# Patient Record
Sex: FEMALE | Race: BLACK OR AFRICAN AMERICAN | Employment: FULL TIME | URBAN - METROPOLITAN AREA
[De-identification: names, ages, dates, MRNs, and addresses within clinical notes are randomized per-mention and may not be internally consistent; named-entity substitution may affect disease eponyms.]

---

## 2017-08-16 ENCOUNTER — HOSPITAL ENCOUNTER (OUTPATIENT)
Age: 42
Setting detail: SPECIMEN
Discharge: HOME | End: 2017-08-16
Payer: COMMERCIAL

## 2017-08-16 DIAGNOSIS — D25.9 UTERINE LEIOMYOMA, UNSPECIFIED LOCATION: ICD-10-CM

## 2017-08-16 DIAGNOSIS — I10 ESSENTIAL HYPERTENSION: ICD-10-CM

## 2017-08-16 DIAGNOSIS — E55.9 VITAMIN D DEFICIENCY: ICD-10-CM

## 2017-08-16 LAB
ABSOLUTE EOS #: 0.1 K/UL (ref 0–0.4)
ABSOLUTE LYMPH #: 1.9 K/UL (ref 1–4.8)
ABSOLUTE MONO #: 0.5 K/UL (ref 0.1–1.2)
ALBUMIN SERPL-MCNC: 3.8 G/DL (ref 3.5–5.2)
ALBUMIN/GLOBULIN RATIO: 1.1 (ref 1–2.5)
ALP BLD-CCNC: 72 U/L (ref 35–104)
ALT SERPL-CCNC: 15 U/L (ref 5–33)
ANION GAP SERPL CALCULATED.3IONS-SCNC: 15 MMOL/L (ref 9–17)
AST SERPL-CCNC: 17 U/L
BASOPHILS # BLD: 1 %
BASOPHILS ABSOLUTE: 0.1 K/UL (ref 0–0.2)
BILIRUB SERPL-MCNC: 0.31 MG/DL (ref 0.3–1.2)
BUN BLDV-MCNC: 17 MG/DL (ref 6–20)
BUN/CREAT BLD: NORMAL (ref 9–20)
CALCIUM SERPL-MCNC: 9.3 MG/DL (ref 8.6–10.4)
CHLORIDE BLD-SCNC: 99 MMOL/L (ref 98–107)
CO2: 23 MMOL/L (ref 20–31)
CREAT SERPL-MCNC: 0.79 MG/DL (ref 0.5–0.9)
DIFFERENTIAL TYPE: NORMAL
EOSINOPHILS RELATIVE PERCENT: 2 %
GFR AFRICAN AMERICAN: >60 ML/MIN
GFR NON-AFRICAN AMERICAN: >60 ML/MIN
GFR SERPL CREATININE-BSD FRML MDRD: NORMAL ML/MIN/{1.73_M2}
GFR SERPL CREATININE-BSD FRML MDRD: NORMAL ML/MIN/{1.73_M2}
GLUCOSE BLD-MCNC: 89 MG/DL (ref 70–99)
HCT VFR BLD CALC: 36.4 % (ref 36–46)
HEMOGLOBIN: 12.3 G/DL (ref 12–16)
LYMPHOCYTES # BLD: 31 %
MCH RBC QN AUTO: 29.5 PG (ref 26–34)
MCHC RBC AUTO-ENTMCNC: 33.7 G/DL (ref 31–37)
MCV RBC AUTO: 87.7 FL (ref 80–100)
MONOCYTES # BLD: 8 %
PDW BLD-RTO: 14.1 % (ref 12.5–15.4)
PLATELET # BLD: 281 K/UL (ref 140–450)
PLATELET ESTIMATE: NORMAL
PMV BLD AUTO: 8.6 FL (ref 6–12)
POTASSIUM SERPL-SCNC: 3.7 MMOL/L (ref 3.7–5.3)
RBC # BLD: 4.15 M/UL (ref 4–5.2)
RBC # BLD: NORMAL 10*6/UL
SEG NEUTROPHILS: 58 %
SEGMENTED NEUTROPHILS ABSOLUTE COUNT: 3.5 K/UL (ref 1.8–7.7)
SODIUM BLD-SCNC: 137 MMOL/L (ref 135–144)
TOTAL PROTEIN: 7.4 G/DL (ref 6.4–8.3)
VITAMIN D 25-HYDROXY: 27.3 NG/ML (ref 30–100)
WBC # BLD: 6.1 K/UL (ref 3.5–11)
WBC # BLD: NORMAL 10*3/UL

## 2018-05-02 PROBLEM — G43.109 MIGRAINE WITH AURA AND WITHOUT STATUS MIGRAINOSUS, NOT INTRACTABLE: Status: ACTIVE | Noted: 2018-05-02

## 2018-06-08 ENCOUNTER — HOSPITAL ENCOUNTER (OUTPATIENT)
Age: 43
Setting detail: SPECIMEN
Discharge: HOME | End: 2018-06-08
Payer: COMMERCIAL

## 2018-06-08 DIAGNOSIS — R35.0 URINARY FREQUENCY: ICD-10-CM

## 2018-06-09 LAB
CULTURE: NORMAL
CULTURE: NORMAL
Lab: NORMAL
SPECIMEN DESCRIPTION: NORMAL
STATUS: NORMAL

## 2018-09-24 ENCOUNTER — HOSPITAL ENCOUNTER (OUTPATIENT)
Age: 43
Setting detail: SPECIMEN
Discharge: HOME | End: 2018-09-24
Payer: COMMERCIAL

## 2018-09-24 DIAGNOSIS — Z13.1 SCREENING FOR DIABETES MELLITUS: ICD-10-CM

## 2018-09-24 DIAGNOSIS — I10 ESSENTIAL HYPERTENSION: ICD-10-CM

## 2018-09-24 LAB
ALBUMIN SERPL-MCNC: 4 G/DL (ref 3.5–5.2)
ALBUMIN/GLOBULIN RATIO: 1 (ref 1–2.5)
ALP BLD-CCNC: 90 U/L (ref 35–104)
ALT SERPL-CCNC: 19 U/L (ref 5–33)
ANION GAP SERPL CALCULATED.3IONS-SCNC: 12 MMOL/L (ref 9–17)
AST SERPL-CCNC: 17 U/L
BILIRUB SERPL-MCNC: 0.26 MG/DL (ref 0.3–1.2)
BUN BLDV-MCNC: 15 MG/DL (ref 6–20)
BUN/CREAT BLD: ABNORMAL (ref 9–20)
CALCIUM SERPL-MCNC: 9.6 MG/DL (ref 8.6–10.4)
CHLORIDE BLD-SCNC: 101 MMOL/L (ref 98–107)
CHOLESTEROL/HDL RATIO: 4.2
CHOLESTEROL: 195 MG/DL
CO2: 29 MMOL/L (ref 20–31)
CREAT SERPL-MCNC: 0.95 MG/DL (ref 0.5–0.9)
GFR AFRICAN AMERICAN: >60 ML/MIN
GFR NON-AFRICAN AMERICAN: >60 ML/MIN
GFR SERPL CREATININE-BSD FRML MDRD: ABNORMAL ML/MIN/{1.73_M2}
GFR SERPL CREATININE-BSD FRML MDRD: ABNORMAL ML/MIN/{1.73_M2}
GLUCOSE FASTING: 99 MG/DL (ref 70–99)
HDLC SERPL-MCNC: 46 MG/DL
LDL CHOLESTEROL: 133 MG/DL (ref 0–130)
POTASSIUM SERPL-SCNC: 3.6 MMOL/L (ref 3.7–5.3)
SODIUM BLD-SCNC: 142 MMOL/L (ref 135–144)
TOTAL PROTEIN: 8.1 G/DL (ref 6.4–8.3)
TRIGL SERPL-MCNC: 82 MG/DL
VLDLC SERPL CALC-MCNC: ABNORMAL MG/DL (ref 1–30)

## 2018-12-27 ENCOUNTER — HOSPITAL ENCOUNTER (OUTPATIENT)
Age: 43
Setting detail: SPECIMEN
Discharge: HOME | End: 2018-12-27
Payer: COMMERCIAL

## 2018-12-27 DIAGNOSIS — I10 ESSENTIAL HYPERTENSION: ICD-10-CM

## 2018-12-27 LAB
ANION GAP SERPL CALCULATED.3IONS-SCNC: 12 MMOL/L (ref 9–17)
BUN BLDV-MCNC: 11 MG/DL (ref 6–20)
BUN/CREAT BLD: ABNORMAL (ref 9–20)
CALCIUM SERPL-MCNC: 9.3 MG/DL (ref 8.6–10.4)
CHLORIDE BLD-SCNC: 103 MMOL/L (ref 98–107)
CO2: 26 MMOL/L (ref 20–31)
CREAT SERPL-MCNC: 0.97 MG/DL (ref 0.5–0.9)
GFR AFRICAN AMERICAN: >60 ML/MIN
GFR NON-AFRICAN AMERICAN: >60 ML/MIN
GFR SERPL CREATININE-BSD FRML MDRD: ABNORMAL ML/MIN/{1.73_M2}
GFR SERPL CREATININE-BSD FRML MDRD: ABNORMAL ML/MIN/{1.73_M2}
GLUCOSE BLD-MCNC: 96 MG/DL (ref 70–99)
POTASSIUM SERPL-SCNC: 3.9 MMOL/L (ref 3.7–5.3)
SODIUM BLD-SCNC: 141 MMOL/L (ref 135–144)

## 2019-05-30 ENCOUNTER — HOSPITAL ENCOUNTER (OUTPATIENT)
Age: 44
Setting detail: SPECIMEN
Discharge: HOME | End: 2019-05-30
Payer: COMMERCIAL

## 2019-05-30 DIAGNOSIS — I10 ESSENTIAL HYPERTENSION: ICD-10-CM

## 2019-05-30 LAB
ANION GAP SERPL CALCULATED.3IONS-SCNC: 15 MMOL/L (ref 9–17)
BUN BLDV-MCNC: 19 MG/DL (ref 6–20)
BUN/CREAT BLD: ABNORMAL (ref 9–20)
CALCIUM SERPL-MCNC: 9.2 MG/DL (ref 8.6–10.4)
CHLORIDE BLD-SCNC: 103 MMOL/L (ref 98–107)
CO2: 26 MMOL/L (ref 20–31)
CREAT SERPL-MCNC: 0.9 MG/DL (ref 0.5–0.9)
GFR AFRICAN AMERICAN: >60 ML/MIN
GFR NON-AFRICAN AMERICAN: >60 ML/MIN
GFR SERPL CREATININE-BSD FRML MDRD: ABNORMAL ML/MIN/{1.73_M2}
GFR SERPL CREATININE-BSD FRML MDRD: ABNORMAL ML/MIN/{1.73_M2}
GLUCOSE BLD-MCNC: 92 MG/DL (ref 70–99)
POTASSIUM SERPL-SCNC: 3.3 MMOL/L (ref 3.7–5.3)
SODIUM BLD-SCNC: 144 MMOL/L (ref 135–144)

## 2019-06-14 ENCOUNTER — HOSPITAL ENCOUNTER (OUTPATIENT)
Age: 44
Setting detail: SPECIMEN
Discharge: HOME | End: 2019-06-14
Payer: COMMERCIAL

## 2019-06-14 DIAGNOSIS — E87.6 HYPOKALEMIA: ICD-10-CM

## 2019-06-14 LAB — POTASSIUM SERPL-SCNC: 3.6 MMOL/L (ref 3.7–5.3)

## 2019-10-17 ENCOUNTER — HOSPITAL ENCOUNTER (OUTPATIENT)
Age: 44
Setting detail: SPECIMEN
Discharge: HOME | End: 2019-10-17
Payer: COMMERCIAL

## 2019-10-17 ENCOUNTER — HOSPITAL ENCOUNTER (OUTPATIENT)
Dept: MAMMOGRAPHY | Facility: CLINIC | Age: 44
Discharge: HOME | End: 2019-10-19
Payer: COMMERCIAL

## 2019-10-17 DIAGNOSIS — Z13.220 SCREENING FOR LIPID DISORDERS: ICD-10-CM

## 2019-10-17 DIAGNOSIS — Z13.1 SCREENING FOR DIABETES MELLITUS: ICD-10-CM

## 2019-10-17 DIAGNOSIS — I10 ESSENTIAL HYPERTENSION: ICD-10-CM

## 2019-10-17 DIAGNOSIS — Z12.31 SCREENING MAMMOGRAM, ENCOUNTER FOR: ICD-10-CM

## 2019-10-17 LAB
ALBUMIN SERPL-MCNC: 3.8 G/DL (ref 3.5–5.2)
ALBUMIN/GLOBULIN RATIO: 1 (ref 1–2.5)
ALP BLD-CCNC: 86 U/L (ref 35–104)
ALT SERPL-CCNC: 13 U/L (ref 5–33)
ANION GAP SERPL CALCULATED.3IONS-SCNC: 13 MMOL/L (ref 9–17)
AST SERPL-CCNC: 15 U/L
BILIRUB SERPL-MCNC: 0.33 MG/DL (ref 0.3–1.2)
BUN BLDV-MCNC: 14 MG/DL (ref 6–20)
BUN/CREAT BLD: ABNORMAL (ref 9–20)
CALCIUM SERPL-MCNC: 9.2 MG/DL (ref 8.6–10.4)
CHLORIDE BLD-SCNC: 104 MMOL/L (ref 98–107)
CHOLESTEROL/HDL RATIO: 3.5
CHOLESTEROL: 180 MG/DL
CO2: 26 MMOL/L (ref 20–31)
CREAT SERPL-MCNC: 0.92 MG/DL (ref 0.5–0.9)
GFR AFRICAN AMERICAN: >60 ML/MIN
GFR NON-AFRICAN AMERICAN: >60 ML/MIN
GFR SERPL CREATININE-BSD FRML MDRD: ABNORMAL ML/MIN/{1.73_M2}
GFR SERPL CREATININE-BSD FRML MDRD: ABNORMAL ML/MIN/{1.73_M2}
GLUCOSE FASTING: 98 MG/DL (ref 70–99)
HDLC SERPL-MCNC: 52 MG/DL
LDL CHOLESTEROL: 108 MG/DL (ref 0–130)
POTASSIUM SERPL-SCNC: 3.5 MMOL/L (ref 3.7–5.3)
SODIUM BLD-SCNC: 143 MMOL/L (ref 135–144)
TOTAL PROTEIN: 7.8 G/DL (ref 6.4–8.3)
TRIGL SERPL-MCNC: 102 MG/DL
VLDLC SERPL CALC-MCNC: NORMAL MG/DL (ref 1–30)

## 2019-10-17 PROCEDURE — 77067 SCR MAMMO BI INCL CAD: CPT

## 2020-08-17 ENCOUNTER — HOSPITAL ENCOUNTER (OUTPATIENT)
Age: 45
Setting detail: SPECIMEN
Discharge: HOME | End: 2020-08-17
Payer: COMMERCIAL

## 2020-08-17 LAB
ALBUMIN SERPL-MCNC: 3.6 G/DL (ref 3.5–5.2)
ALBUMIN/GLOBULIN RATIO: 1.1 (ref 1–2.5)
ALP BLD-CCNC: 70 U/L (ref 35–104)
ALT SERPL-CCNC: 13 U/L (ref 5–33)
ANION GAP SERPL CALCULATED.3IONS-SCNC: 11 MMOL/L (ref 9–17)
AST SERPL-CCNC: 16 U/L
BILIRUB SERPL-MCNC: 0.27 MG/DL (ref 0.3–1.2)
BUN BLDV-MCNC: 12 MG/DL (ref 6–20)
BUN/CREAT BLD: ABNORMAL (ref 9–20)
CALCIUM SERPL-MCNC: 9 MG/DL (ref 8.6–10.4)
CHLORIDE BLD-SCNC: 103 MMOL/L (ref 98–107)
CHOLESTEROL/HDL RATIO: 3.5
CHOLESTEROL: 175 MG/DL
CO2: 26 MMOL/L (ref 20–31)
CREAT SERPL-MCNC: 0.93 MG/DL (ref 0.5–0.9)
GFR AFRICAN AMERICAN: >60 ML/MIN
GFR NON-AFRICAN AMERICAN: >60 ML/MIN
GFR SERPL CREATININE-BSD FRML MDRD: ABNORMAL ML/MIN/{1.73_M2}
GFR SERPL CREATININE-BSD FRML MDRD: ABNORMAL ML/MIN/{1.73_M2}
GLUCOSE FASTING: 91 MG/DL (ref 70–99)
HDLC SERPL-MCNC: 50 MG/DL
LDL CHOLESTEROL: 111 MG/DL (ref 0–130)
POTASSIUM SERPL-SCNC: 3.6 MMOL/L (ref 3.7–5.3)
SODIUM BLD-SCNC: 140 MMOL/L (ref 135–144)
TOTAL PROTEIN: 7 G/DL (ref 6.4–8.3)
TRIGL SERPL-MCNC: 72 MG/DL
VLDLC SERPL CALC-MCNC: NORMAL MG/DL (ref 1–30)

## 2020-11-05 ENCOUNTER — APPOINTMENT (OUTPATIENT)
Dept: GENERAL RADIOLOGY | Facility: CLINIC | Age: 45
End: 2020-11-05
Payer: COMMERCIAL

## 2020-11-05 ENCOUNTER — APPOINTMENT (OUTPATIENT)
Dept: CT IMAGING | Facility: CLINIC | Age: 45
End: 2020-11-05
Payer: COMMERCIAL

## 2020-11-05 ENCOUNTER — HOSPITAL ENCOUNTER (EMERGENCY)
Facility: CLINIC | Age: 45
Discharge: HOME | End: 2020-11-05
Attending: EMERGENCY MEDICINE
Payer: COMMERCIAL

## 2020-11-05 VITALS
HEART RATE: 89 BPM | BODY MASS INDEX: 36.32 KG/M2 | OXYGEN SATURATION: 99 % | HEIGHT: 65 IN | TEMPERATURE: 98.3 F | RESPIRATION RATE: 20 BRPM | WEIGHT: 218 LBS | SYSTOLIC BLOOD PRESSURE: 155 MMHG | DIASTOLIC BLOOD PRESSURE: 75 MMHG

## 2020-11-05 LAB
ABSOLUTE EOS #: 0.1 K/UL (ref 0–0.4)
ABSOLUTE IMMATURE GRANULOCYTE: NORMAL K/UL (ref 0–0.3)
ABSOLUTE LYMPH #: 2.3 K/UL (ref 1–4.8)
ABSOLUTE MONO #: 0.4 K/UL (ref 0.1–1.2)
ALBUMIN SERPL-MCNC: 4.2 G/DL (ref 3.5–5.2)
ALBUMIN/GLOBULIN RATIO: 1.1 (ref 1–2.5)
ALP BLD-CCNC: 83 U/L (ref 35–104)
ALT SERPL-CCNC: 13 U/L (ref 5–33)
ANION GAP SERPL CALCULATED.3IONS-SCNC: 10 MMOL/L (ref 9–17)
AST SERPL-CCNC: 19 U/L
BASOPHILS # BLD: 0 % (ref 0–2)
BASOPHILS ABSOLUTE: 0 K/UL (ref 0–0.2)
BILIRUB SERPL-MCNC: 0.3 MG/DL (ref 0.3–1.2)
BILIRUBIN DIRECT: <0.08 MG/DL
BILIRUBIN, INDIRECT: NORMAL MG/DL (ref 0–1)
BNP INTERPRETATION: NORMAL
BUN BLDV-MCNC: 16 MG/DL (ref 6–20)
BUN/CREAT BLD: ABNORMAL (ref 9–20)
CALCIUM SERPL-MCNC: 9.4 MG/DL (ref 8.6–10.4)
CHLORIDE BLD-SCNC: 105 MMOL/L (ref 98–107)
CK MB: 2.4 NG/ML
CO2: 25 MMOL/L (ref 20–31)
CREAT SERPL-MCNC: 1 MG/DL (ref 0.5–0.9)
D-DIMER QUANTITATIVE: 0.61 MG/L FEU
DIFFERENTIAL TYPE: NORMAL
EOSINOPHILS RELATIVE PERCENT: 1 % (ref 1–4)
GFR AFRICAN AMERICAN: >60 ML/MIN
GFR NON-AFRICAN AMERICAN: 59 ML/MIN
GFR SERPL CREATININE-BSD FRML MDRD: ABNORMAL ML/MIN/{1.73_M2}
GFR SERPL CREATININE-BSD FRML MDRD: ABNORMAL ML/MIN/{1.73_M2}
GLOBULIN: NORMAL G/DL (ref 1.5–3.8)
GLUCOSE BLD-MCNC: 95 MG/DL (ref 70–99)
HCT VFR BLD CALC: 38.6 % (ref 36–46)
HEMOGLOBIN: 12.7 G/DL (ref 12–16)
IMMATURE GRANULOCYTES: NORMAL %
LYMPHOCYTES # BLD: 34 % (ref 24–44)
MAGNESIUM: 2.2 MG/DL (ref 1.6–2.6)
MCH RBC QN AUTO: 29 PG (ref 26–34)
MCHC RBC AUTO-ENTMCNC: 33 G/DL (ref 31–37)
MCV RBC AUTO: 87.8 FL (ref 80–100)
MONOCYTES # BLD: 6 % (ref 2–11)
MYOGLOBIN: 44 NG/ML (ref 25–58)
NRBC AUTOMATED: NORMAL PER 100 WBC
PDW BLD-RTO: 14.1 % (ref 12.5–15.4)
PLATELET # BLD: 282 K/UL (ref 140–450)
PLATELET ESTIMATE: NORMAL
PMV BLD AUTO: 7.4 FL (ref 6–12)
POTASSIUM SERPL-SCNC: 3.6 MMOL/L (ref 3.7–5.3)
PRO-BNP: 93 PG/ML
RBC # BLD: 4.39 M/UL (ref 4–5.2)
RBC # BLD: NORMAL 10*6/UL
SEG NEUTROPHILS: 59 % (ref 36–66)
SEGMENTED NEUTROPHILS ABSOLUTE COUNT: 4 K/UL (ref 1.8–7.7)
SODIUM BLD-SCNC: 140 MMOL/L (ref 135–144)
TOTAL CK: 303 U/L (ref 26–192)
TOTAL PROTEIN: 8.1 G/DL (ref 6.4–8.3)
TROPONIN INTERP: NORMAL
TROPONIN T: NORMAL NG/ML
TROPONIN, HIGH SENSITIVITY: <6 NG/L (ref 0–14)
WBC # BLD: 6.8 K/UL (ref 3.5–11)
WBC # BLD: NORMAL 10*3/UL

## 2020-11-05 PROCEDURE — 85025 COMPLETE CBC W/AUTO DIFF WBC: CPT

## 2020-11-05 PROCEDURE — 6360000004 HC RX CONTRAST MEDICATION: Performed by: EMERGENCY MEDICINE

## 2020-11-05 PROCEDURE — 36415 COLL VENOUS BLD VENIPUNCTURE: CPT

## 2020-11-05 PROCEDURE — 80076 HEPATIC FUNCTION PANEL: CPT

## 2020-11-05 PROCEDURE — 6370000000 HC RX 637 (ALT 250 FOR IP): Performed by: EMERGENCY MEDICINE

## 2020-11-05 PROCEDURE — 93005 ELECTROCARDIOGRAM TRACING: CPT | Performed by: EMERGENCY MEDICINE

## 2020-11-05 PROCEDURE — 83874 ASSAY OF MYOGLOBIN: CPT

## 2020-11-05 PROCEDURE — 82550 ASSAY OF CK (CPK): CPT

## 2020-11-05 PROCEDURE — 82553 CREATINE MB FRACTION: CPT

## 2020-11-05 PROCEDURE — 2580000003 HC RX 258: Performed by: EMERGENCY MEDICINE

## 2020-11-05 PROCEDURE — 99285 EMERGENCY DEPT VISIT HI MDM: CPT

## 2020-11-05 PROCEDURE — 71260 CT THORAX DX C+: CPT

## 2020-11-05 PROCEDURE — 80048 BASIC METABOLIC PNL TOTAL CA: CPT

## 2020-11-05 PROCEDURE — 71045 X-RAY EXAM CHEST 1 VIEW: CPT

## 2020-11-05 PROCEDURE — 96360 HYDRATION IV INFUSION INIT: CPT

## 2020-11-05 PROCEDURE — 85379 FIBRIN DEGRADATION QUANT: CPT

## 2020-11-05 PROCEDURE — 83880 ASSAY OF NATRIURETIC PEPTIDE: CPT

## 2020-11-05 PROCEDURE — 83735 ASSAY OF MAGNESIUM: CPT

## 2020-11-05 PROCEDURE — 84484 ASSAY OF TROPONIN QUANT: CPT

## 2020-11-05 RX ORDER — POTASSIUM CHLORIDE 20 MEQ/1
20 TABLET, EXTENDED RELEASE ORAL DAILY
Qty: 20 TABLET | Refills: 0 | Status: ON HOLD | OUTPATIENT
Start: 2020-11-05 | End: 2022-11-01 | Stop reason: HOSPADM

## 2020-11-05 RX ORDER — 0.9 % SODIUM CHLORIDE 0.9 %
1000 INTRAVENOUS SOLUTION INTRAVENOUS ONCE
Status: COMPLETED | OUTPATIENT
Start: 2020-11-05 | End: 2020-11-05

## 2020-11-05 RX ORDER — SODIUM CHLORIDE 0.9 % (FLUSH) 0.9 %
10 SYRINGE (ML) INJECTION PRN
Status: DISCONTINUED | OUTPATIENT
Start: 2020-11-05 | End: 2020-11-05 | Stop reason: HOSPADM

## 2020-11-05 RX ORDER — 0.9 % SODIUM CHLORIDE 0.9 %
70 INTRAVENOUS SOLUTION INTRAVENOUS ONCE
Status: COMPLETED | OUTPATIENT
Start: 2020-11-05 | End: 2020-11-05

## 2020-11-05 RX ADMIN — IOPAMIDOL 75 ML: 755 INJECTION, SOLUTION INTRAVENOUS at 12:10

## 2020-11-05 RX ADMIN — SODIUM CHLORIDE 70 ML: 9 INJECTION, SOLUTION INTRAVENOUS at 12:10

## 2020-11-05 RX ADMIN — POTASSIUM BICARBONATE 40 MEQ: 782 TABLET, EFFERVESCENT ORAL at 11:56

## 2020-11-05 RX ADMIN — SODIUM CHLORIDE 1000 ML: 9 INJECTION, SOLUTION INTRAVENOUS at 11:56

## 2020-11-05 RX ADMIN — SODIUM CHLORIDE, PRESERVATIVE FREE 10 ML: 5 INJECTION INTRAVENOUS at 12:10

## 2020-11-05 ASSESSMENT — PAIN SCALES - GENERAL: PAINLEVEL_OUTOF10: 8

## 2020-11-05 ASSESSMENT — PAIN DESCRIPTION - PAIN TYPE: TYPE: ACUTE PAIN

## 2020-11-05 ASSESSMENT — PAIN DESCRIPTION - LOCATION: LOCATION: CHEST

## 2020-11-05 NOTE — ED PROVIDER NOTES
[address] ED  EMERGENCY DEPARTMENT ENCOUNTER      Pt Name: Alexis Vasquez  MRN: [id number]  [place] 1975  Date of evaluation: 11/5/2020  Provider: Krista Villegas MD    CHIEF COMPLAINT     Chief Complaint   Patient presents with    Chest Pain     chest pressure, shortness of breath, pain with inspiration since tuesday         HISTORY OF PRESENT ILLNESS   (Location/Symptom, Timing/Onset, Context/Setting,Quality, Duration, Modifying Factors, Severity)  Note limiting factors. Alexis Vasquez is a 50 y.o. female who presents to the emergency department with a 2-day history of pressure-like pain over the anterior chest and states that if she speaks for too long she feels short of breath but not otherwise. Pain does not radiate. About the same time she also reports pleuritic pain underneath and onto the side of the left breast that she experiences with deep breathing. She denies wheezing or hemoptysis, chills or fever. Patient has a history of hypertension but no history of heart problems and is not diabetic. She is not a cigarette smoker and is not on any hormone replacement therapy. The history is provided by the patient. Nursing Notes werereviewed. REVIEW OF SYSTEMS    (2-9 systems for level 4, 10 or more for level 5)     Review of Systems   Constitutional: Negative for fatigue and fever. Musculoskeletal: Negative for myalgias. All other systems reviewed and are negative. Except as noted above the remainder of the review of systems was reviewed and negative.        PAST MEDICAL HISTORY     Past Medical History:   Diagnosis Date    Asthma     Headache     Hyperlipidemia     Hypertension     Insomnia          SURGICALHISTORY       Past Surgical History:   Procedure Laterality Date    TUBAL LIGATION  1998         CURRENT MEDICATIONS       Discharge Medication List as of 11/5/2020 12:53 PM      CONTINUE these medications which have NOT CHANGED    Details   montelukast (SINGULAIR) 10 MG tablet TAKE 1 TABLET BY MOUTH ONE TIME A DAY , Disp-30 tablet,R-0Normal      SUMAtriptan (IMITREX) 50 MG tablet Take 1 tablet by mouth as needed for Migraine Take 1 tablet by mouth daily as needed for Migraine, Disp-9 tablet,R-1Normal      budesonide-formoterol (SYMBICORT) 160-4.5 MCG/ACT AERO Inhale 2 puffs into the lungs 2 times daily, Disp-1 Inhaler,R-2Normal      atorvastatin (LIPITOR) 10 MG tablet Take One Tablet By Mouth Daily, Disp-30 tablet,R-2Normal      hydroCHLOROthiazide (HYDRODIURIL) 25 MG tablet Take One Tablet By Mouth Daily, Disp-30 tablet,R-2Normal      meloxicam (MOBIC) 15 MG tablet Take 1 tablet by mouth daily, Disp-30 tablet,R-2Normal      metoprolol succinate (TOPROL XL) 25 MG extended release tablet Take One Table By Mouth Daily, Disp-30 tablet,R-2Normal      albuterol (PROVENTIL) (2.5 MG/3ML) 0.083% nebulizer solution Take 3 mLs by nebulization every 6 hours as needed for Wheezing, Disp-60 each, R-1Normal      albuterol sulfate HFA (PROAIR HFA) 108 (90 Base) MCG/ACT inhaler Inhale 2 puffs into the lungs every 6 hours as needed for Wheezing, Disp-1 Inhaler, R-1Normal      albuterol (PROVENTIL) (5 MG/ML) 0.5% nebulizer solution Take 1 mL by nebulization 4 times daily as needed for Wheezing, Disp-120 each, R-1Normal      benzonatate (TESSALON) 100 MG capsule TAKE 1 CAPSULE BY MOUTH THREE TIMES A DAY AS NEEDED for cough, R-0Historical Med      PREMPRO 0.3-1.5 MG per tablet DAWHistorical Med      Spacer/Aero-Holding Chambers YENNIFER DAILY PRN Starting Mon 2/25/2019, Disp-1 Device, R-0, Normal      Cholecalciferol (VITAMIN D3) 2000 UNITS CAPS Take 1 capsule by mouth daily             ALLERGIES     Tramadol    FAMILY HISTORY       Family History   Problem Relation Age of Onset    Other Father         MI    Hypertension Brother           SOCIAL HISTORY       Social History     Socioeconomic History    Marital status: Single     Spouse name: Not on file    Number of children: Not on file    Years of education: Not on file    Highest education level: Not on file   Occupational History    Not on file   Social Needs    Financial resource strain: Not hard at all    Food insecurity     Worry: Never true     Inability: Never true   [employer] needs     Medical: No     Non-medical: No   Tobacco Use    Smoking status: Never Smoker    Smokeless tobacco: Never Used   Substance and Sexual Activity    Alcohol use: Yes     Alcohol/week: 1.0 standard drinks     Types: 1 Glasses of wine per week     Comment: rare wine     Drug use: No    Sexual activity: Not on file   Lifestyle    Physical activity     Days per week: Not on file     Minutes per session: Not on file    Stress: Not on file   Relationships    Social connections     Talks on phone: Not on file     Gets together: Not on file     Attends Protestant service: Not on file     Active member of club or organization: Not on file     Attends meetings of clubs or organizations: Not on file     Relationship status: Not on file    Intimate partner violence     Fear of current or ex partner: Not on file     Emotionally abused: Not on file     Physically abused: Not on file     Forced sexual activity: Not on file   Other Topics Concern    Not on file   Social History Narrative    Not on file       SCREENINGS    [place] Coma Scale  Eye Opening: Spontaneous  Best Verbal Response: Oriented  Best Motor Response: Obeys commands  [place] Coma Scale Score: 15        PHYSICAL EXAM    (up to 7 for level 4, 8 or more for level 5)     ED Triage Vitals [11/05/20 1048]   BP Temp Temp Source Pulse Resp SpO2 Height Weight   (!) 177/107 98.3 °F (36.8 °C) Oral 100 20 99 % 5' 5\" (1.651 m) 218 lb (98.9 kg)       Physical Exam  Vitals signs reviewed. Constitutional:       General: She is not in acute distress. Appearance: She is obese. HENT:      Head: Normocephalic.       Right Ear: External ear normal.      Left Ear: External ear normal.      Nose: Nose normal.   Eyes:      Extraocular Movements: Extraocular movements intact. Neck:      Musculoskeletal: Neck supple. Cardiovascular:      Rate and Rhythm: Normal rate and regular rhythm. Heart sounds: Normal heart sounds. Pulmonary:      Effort: Pulmonary effort is normal. No respiratory distress. Breath sounds: Normal breath sounds. Chest:      Chest wall: Tenderness present. Abdominal:      Palpations: Abdomen is soft. Tenderness: There is no abdominal tenderness. Musculoskeletal: Normal range of motion. General: No swelling, tenderness or signs of injury. Skin:     General: Skin is warm and dry. Neurological:      General: No focal deficit present. Mental Status: She is alert and oriented to person, place, and time. DIAGNOSTIC RESULTS     EKG: All EKG's are interpreted by the Emergency Department Physician who either signs orCo-signs this chart in the absence of a cardiologist.    Sinus rhythm with rate 86 beats a minute. No acute findings. RADIOLOGY:   Non-plain film images such as CT, Ultrasound and MRI are read by the radiologist. Plain radiographic images are visualized and preliminarily interpreted by the emergency physician with the below findings:    Interpretation per the Radiologist below, ifavailable at the time of this note:    CT CHEST PULMONARY EMBOLISM W CONTRAST   Final Result   No evidence of pulmonary embolism or acute pulmonary abnormality. XR CHEST PORTABLE   Final Result   No radiographic evidence of acute cardiopulmonary disease. ED BEDSIDE ULTRASOUND:   Performed by ED Physician - none    LABS:  Labs Reviewed   BASIC METABOLIC PANEL - Abnormal; Notable for the following components:       Result Value    CREATININE 1.00 (*)     Potassium 3.6 (*)     GFR Non- 59 (*)     All other components within normal limits   CK - Abnormal; Notable for the following components:     Total  (*)     All other components within normal limits   CBC WITH AUTO DIFFERENTIAL   BRAIN NATRIURETIC PEPTIDE   HEPATIC FUNCTION PANEL   MAGNESIUM   TROPONIN   D-DIMER, QUANTITATIVE   CK-MB   MYOGLOBIN, SERUM       All other labs were within normal range ornot returned as of this dictation. EMERGENCY DEPARTMENT COURSE and DIFFERENTIAL DIAGNOSIS/MDM:   Vitals:    Vitals:    11/05/20 1048 11/05/20 1154   BP: (!) 177/107 (!) 155/75   Pulse: 100 89   Resp: 20 20   Temp: 98.3 °F (36.8 °C) 98.3 °F (36.8 °C)   TempSrc: Oral Oral   SpO2: 99% 99%   Weight: 98.9 kg (218 lb)    Height: 5' 5\" (1.651 m)             Patient had a slightly low potassium level which was replenished with oral potassium in the ED. She is on diuretics at home. She also takes a statin. Her chest pain is felt to be musculoskeletal in etiology. Upon discharge she is placed on oral potassium supplementation and is advised to hold her statin for a few weeks to see if that helps her pain. She is advised to follow-up with her primary care physician for further management. MDM    CONSULTS:  None    PROCEDURES:  Unlessotherwise noted below, none     Procedures    FINAL IMPRESSION      1. Atypical chest pain          DISPOSITION/PLAN   DISPOSITION Decision To Discharge 11/05/2020 12:50:55 PM      PATIENT REFERRED TO:  Savita Deng DO  [address] [place] [id number]  [phone]            DISCHARGE MEDICATIONS:         Problem List:  Patient Active Problem List   Diagnosis Code    Asthma J45.909    Essential hypertension I10    Pure hypercholesterolemia E78.00    Uterine fibroid D25.9    Migraine with aura and without status migrainosus, not intractable G43.109           Summation      Patient Course: Discharged.     ED Medicationsadministered this visit:    Medications   potassium bicarb-citric acid (EFFER-K) effervescent tablet 40 mEq (40 mEq Oral Given 11/5/20 1156)   0.9 % sodium chloride bolus (0 mLs Intravenous Stopped 11/5/20 1256)   0.9 % sodium chloride bolus (0 mLs Intravenous Stopped 11/5/20 1211)   iopamidol (ISOVUE-370) 76 % injection 75 mL (75 mLs Intravenous Given 11/5/20 1210)       New Prescriptions from this visit:    Discharge Medication List as of 11/5/2020 12:53 PM      START taking these medications    Details   potassium chloride (KLOR-CON M) 20 MEQ extended release tablet Take 1 tablet by mouth daily for 20 days, Disp-20 tablet,R-0Print             Follow-up:  Berhane Dang DO  [address] [place] [address]  [phone]              Final Impression:   1.  Atypical chest pain               (Please note that portions of this note were completed with a voice recognitionprogram.  Efforts were made to edit the dictations but occasionally words are mis-transcribed.)    Torres Pelaez MD (electronically signed)  Attending Emergency Physician            Torres Pelaez MD  11/07/20 7019

## 2020-11-06 LAB
EKG ATRIAL RATE: 86 BPM
EKG P AXIS: 76 DEGREES
EKG P-R INTERVAL: 190 MS
EKG Q-T INTERVAL: 370 MS
EKG QRS DURATION: 78 MS
EKG QTC CALCULATION (BAZETT): 442 MS
EKG R AXIS: 45 DEGREES
EKG T AXIS: 18 DEGREES
EKG VENTRICULAR RATE: 86 BPM

## 2021-01-28 ENCOUNTER — HOSPITAL ENCOUNTER (OUTPATIENT)
Dept: MAMMOGRAPHY | Facility: CLINIC | Age: 46
Discharge: HOME | End: 2021-01-30
Payer: COMMERCIAL

## 2021-01-28 DIAGNOSIS — Z12.31 VISIT FOR SCREENING MAMMOGRAM: ICD-10-CM

## 2021-01-28 PROCEDURE — 77067 SCR MAMMO BI INCL CAD: CPT

## 2021-03-01 ENCOUNTER — HOSPITAL ENCOUNTER (OUTPATIENT)
Dept: GENERAL RADIOLOGY | Facility: CLINIC | Age: 46
Discharge: HOME | End: 2021-03-03
Payer: COMMERCIAL

## 2021-03-01 DIAGNOSIS — M54.32 LEFT SCIATIC NERVE PAIN: ICD-10-CM

## 2021-03-01 PROCEDURE — 72100 X-RAY EXAM L-S SPINE 2/3 VWS: CPT

## 2021-03-01 PROCEDURE — 72202 X-RAY EXAM SI JOINTS 3/> VWS: CPT

## 2021-06-16 ENCOUNTER — HOSPITAL ENCOUNTER (EMERGENCY)
Age: 46
Discharge: HOME | End: 2021-06-16
Attending: EMERGENCY MEDICINE
Payer: COMMERCIAL

## 2021-06-16 ENCOUNTER — APPOINTMENT (OUTPATIENT)
Dept: GENERAL RADIOLOGY | Age: 46
End: 2021-06-16
Payer: COMMERCIAL

## 2021-06-16 VITALS
HEIGHT: 55 IN | BODY MASS INDEX: 49.99 KG/M2 | OXYGEN SATURATION: 98 % | SYSTOLIC BLOOD PRESSURE: 149 MMHG | TEMPERATURE: 97.9 F | WEIGHT: 216 LBS | HEART RATE: 85 BPM | DIASTOLIC BLOOD PRESSURE: 92 MMHG | RESPIRATION RATE: 18 BRPM

## 2021-06-16 DIAGNOSIS — R07.9 CHEST PAIN, UNSPECIFIED TYPE: Primary | ICD-10-CM

## 2021-06-16 DIAGNOSIS — S39.012A BACK STRAIN, INITIAL ENCOUNTER: ICD-10-CM

## 2021-06-16 LAB
ABSOLUTE EOS #: 0.08 K/UL (ref 0–0.44)
ABSOLUTE IMMATURE GRANULOCYTE: 0.02 K/UL (ref 0–0.3)
ABSOLUTE LYMPH #: 1.66 K/UL (ref 1.1–3.7)
ABSOLUTE MONO #: 0.44 K/UL (ref 0.1–1.2)
ALBUMIN SERPL-MCNC: 3.7 G/DL (ref 3.5–5.2)
ALBUMIN/GLOBULIN RATIO: ABNORMAL (ref 1–2.5)
ALP BLD-CCNC: 88 U/L (ref 35–104)
ALT SERPL-CCNC: 9 U/L (ref 5–33)
ANION GAP SERPL CALCULATED.3IONS-SCNC: 11 MMOL/L (ref 9–17)
AST SERPL-CCNC: 15 U/L
BASOPHILS # BLD: 1 % (ref 0–2)
BASOPHILS ABSOLUTE: 0.03 K/UL (ref 0–0.2)
BILIRUB SERPL-MCNC: 0.26 MG/DL (ref 0.3–1.2)
BILIRUBIN, POC: NORMAL
BLOOD URINE, POC: NORMAL
BUN BLDV-MCNC: 18 MG/DL (ref 6–20)
BUN/CREAT BLD: 20 (ref 9–20)
CALCIUM SERPL-MCNC: 9.2 MG/DL (ref 8.6–10.4)
CHLORIDE BLD-SCNC: 104 MMOL/L (ref 98–107)
CHP ED QC CHECK: NORMAL
CHP ED QC CHECK: NORMAL
CO2: 23 MMOL/L (ref 20–31)
CREAT SERPL-MCNC: 0.89 MG/DL (ref 0.5–0.9)
D-DIMER QUANTITATIVE: <0.27 MG/L FEU (ref 0–0.59)
DIFFERENTIAL TYPE: ABNORMAL
EOSINOPHILS RELATIVE PERCENT: 1 % (ref 1–4)
GFR AFRICAN AMERICAN: >60 ML/MIN
GFR NON-AFRICAN AMERICAN: >60 ML/MIN
GFR SERPL CREATININE-BSD FRML MDRD: ABNORMAL ML/MIN/{1.73_M2}
GFR SERPL CREATININE-BSD FRML MDRD: ABNORMAL ML/MIN/{1.73_M2}
GLUCOSE BLD-MCNC: 90 MG/DL (ref 70–99)
GLUCOSE URINE, POC: NORMAL
HCT VFR BLD CALC: 37 % (ref 36.3–47.1)
HEMOGLOBIN: 11.7 G/DL (ref 11.9–15.1)
IMMATURE GRANULOCYTES: 0 %
KETONES, POC: NORMAL
LEUKOCYTE EST, POC: NORMAL
LYMPHOCYTES # BLD: 28 % (ref 24–43)
MCH RBC QN AUTO: 28.6 PG (ref 25.2–33.5)
MCHC RBC AUTO-ENTMCNC: 31.6 G/DL (ref 28.4–34.8)
MCV RBC AUTO: 90.5 FL (ref 82.6–102.9)
MONOCYTES # BLD: 7 % (ref 3–12)
NITRITE, POC: NORMAL
NRBC AUTOMATED: 0 PER 100 WBC
PDW BLD-RTO: 13.2 % (ref 11.8–14.4)
PH, POC: 5
PLATELET # BLD: 244 K/UL (ref 138–453)
PLATELET ESTIMATE: ABNORMAL
PMV BLD AUTO: 9.4 FL (ref 8.1–13.5)
POTASSIUM SERPL-SCNC: 3.6 MMOL/L (ref 3.7–5.3)
PREGNANCY TEST URINE, POC: NORMAL
PROTEIN, POC: NORMAL
RBC # BLD: 4.09 M/UL (ref 3.95–5.11)
RBC # BLD: ABNORMAL 10*6/UL
SEG NEUTROPHILS: 63 % (ref 36–65)
SEGMENTED NEUTROPHILS ABSOLUTE COUNT: 3.79 K/UL (ref 1.5–8.1)
SODIUM BLD-SCNC: 138 MMOL/L (ref 135–144)
SPECIFIC GRAVITY, POC: 1.01
TOTAL PROTEIN: 7.2 G/DL (ref 6.4–8.3)
TROPONIN INTERP: NORMAL
TROPONIN T: NORMAL NG/ML
TROPONIN, HIGH SENSITIVITY: <6 NG/L (ref 0–14)
UROBILINOGEN, POC: 0.2
WBC # BLD: 6 K/UL (ref 3.5–11.3)
WBC # BLD: ABNORMAL 10*3/UL

## 2021-06-16 PROCEDURE — 84484 ASSAY OF TROPONIN QUANT: CPT

## 2021-06-16 PROCEDURE — 6360000002 HC RX W HCPCS: Performed by: EMERGENCY MEDICINE

## 2021-06-16 PROCEDURE — 96374 THER/PROPH/DIAG INJ IV PUSH: CPT

## 2021-06-16 PROCEDURE — 85379 FIBRIN DEGRADATION QUANT: CPT

## 2021-06-16 PROCEDURE — 93005 ELECTROCARDIOGRAM TRACING: CPT | Performed by: EMERGENCY MEDICINE

## 2021-06-16 PROCEDURE — 85025 COMPLETE CBC W/AUTO DIFF WBC: CPT

## 2021-06-16 PROCEDURE — 80053 COMPREHEN METABOLIC PANEL: CPT

## 2021-06-16 PROCEDURE — 71046 X-RAY EXAM CHEST 2 VIEWS: CPT

## 2021-06-16 PROCEDURE — 99282 EMERGENCY DEPT VISIT SF MDM: CPT

## 2021-06-16 RX ORDER — IBUPROFEN 800 MG/1
800 TABLET ORAL EVERY 8 HOURS PRN
Qty: 30 TABLET | Refills: 0 | Status: ON HOLD | OUTPATIENT
Start: 2021-06-16 | End: 2022-11-01 | Stop reason: HOSPADM

## 2021-06-16 RX ORDER — CYCLOBENZAPRINE HCL 10 MG
10 TABLET ORAL NIGHTLY PRN
Qty: 10 TABLET | Refills: 0 | Status: SHIPPED | OUTPATIENT
Start: 2021-06-16 | End: 2021-06-26

## 2021-06-16 RX ORDER — KETOROLAC TROMETHAMINE 30 MG/ML
30 INJECTION, SOLUTION INTRAMUSCULAR; INTRAVENOUS ONCE
Status: COMPLETED | OUTPATIENT
Start: 2021-06-16 | End: 2021-06-16

## 2021-06-16 RX ADMIN — KETOROLAC TROMETHAMINE 30 MG: 30 INJECTION, SOLUTION INTRAMUSCULAR; INTRAVENOUS at 07:46

## 2021-06-16 ASSESSMENT — ENCOUNTER SYMPTOMS
SHORTNESS OF BREATH: 0
ABDOMINAL PAIN: 0
BACK PAIN: 1
TROUBLE SWALLOWING: 0

## 2021-06-16 ASSESSMENT — PAIN SCALES - GENERAL
PAINLEVEL_OUTOF10: 8
PAINLEVEL_OUTOF10: 8

## 2021-06-16 ASSESSMENT — PAIN DESCRIPTION - LOCATION: LOCATION: BACK;CHEST

## 2021-06-16 NOTE — ED PROVIDER NOTES
EMERGENCY DEPARTMENT ENCOUNTER    Pt Name: Apryl Villanueva  MRN: [id number]  [place] 1975  Date of evaluation: 6/16/21  CHIEF COMPLAINT       Chief Complaint   Patient presents with    Back Pain     right side    Chest Pain     HISTORY OF PRESENT ILLNESS   Patient is a 67-year-old female with history of hypertension high cholesterol asthma here with right-sided mid back pain wrapping around up under the breast and right side of her chest.  She states it started yesterday. States she has felt fatigued. Denies associated nausea shortness of breath diaphoresis vomiting. She states it is painful to take a deep breath. She denies any injury. Denies any focal weakness numbness tingling arms or legs. Denies any rash fevers or chills. Denies history of heart disease blood clots recent surgeries leg swelling hemoptysis. REVIEW OF SYSTEMS     Review of Systems   Constitutional: Negative for chills and fever. HENT: Negative for trouble swallowing. Eyes: Negative for visual disturbance. Respiratory: Negative for shortness of breath. Cardiovascular: Positive for chest pain. Gastrointestinal: Negative for abdominal pain. Genitourinary: Negative for difficulty urinating. Musculoskeletal: Positive for back pain. Negative for neck pain. Skin: Negative for rash. Neurological: Negative for weakness. Psychiatric/Behavioral: Negative for confusion.      PASTMEDICAL HISTORY     Past Medical History:   Diagnosis Date    Asthma     Headache     Hyperlipidemia     Hypertension     Insomnia      SURGICAL HISTORY       Past Surgical History:   Procedure Laterality Date    TUBAL LIGATION  1998     CURRENT MEDICATIONS       Previous Medications    ALBUTEROL (PROVENTIL) (2.5 MG/3ML) 0.083% NEBULIZER SOLUTION    Take 3 mLs by nebulization every 6 hours as needed for Wheezing    ALBUTEROL (PROVENTIL) (5 MG/ML) 0.5% NEBULIZER SOLUTION    Take 1 mL by nebulization 4 times daily as needed for Wheezing ALBUTEROL SULFATE HFA (PROAIR HFA) 108 (90 BASE) MCG/ACT INHALER    Inhale 2 puffs into the lungs every 6 hours as needed for Wheezing    ATORVASTATIN (LIPITOR) 10 MG TABLET    TAKE 1 TABLET BY MOUTH EVERY DAY     BUDESONIDE-FORMOTEROL (SYMBICORT) 160-4.5 MCG/ACT AERO    Inhale 2 puffs into the lungs 2 times daily    CHOLECALCIFEROL (VITAMIN D3) 2000 UNITS CAPS    Take 1 capsule by mouth daily    HYDROCHLOROTHIAZIDE (HYDRODIURIL) 25 MG TABLET    TAKE 1 TABLET BY MOUTH EVERY DAY     MELOXICAM (MOBIC) 15 MG TABLET    TAKE 1 TABLET BY MOUTH EVERY DAY     METOPROLOL SUCCINATE (TOPROL XL) 25 MG EXTENDED RELEASE TABLET    TAKE 1 TABLET BY MOUTH EVERY DAY     MONTELUKAST (SINGULAIR) 10 MG TABLET    TAKE 1 TABLET BY MOUTH ONE TIME A DAY     POTASSIUM CHLORIDE (KLOR-CON M) 20 MEQ EXTENDED RELEASE TABLET    Take 1 tablet by mouth daily for 20 days    PREMPRO 0.3-1.5 MG PER TABLET        SPACER/AERO-HOLDING CHAMBERS YENNIFER    1 Device by Does not apply route daily as needed (wheezing)    SUMATRIPTAN (IMITREX) 50 MG TABLET    Take 1 tablet by mouth as needed for Migraine Take 1 tablet by mouth daily as needed for Migraine     ALLERGIES     is allergic to tramadol. FAMILY HISTORY     She indicated that her father is . She indicated that the status of her brother is unknown. SOCIAL HISTORY       Social History     Tobacco Use    Smoking status: Never Smoker    Smokeless tobacco: Never Used   Substance Use Topics    Alcohol use: Yes     Alcohol/week: 1.0 standard drinks     Types: 1 Glasses of wine per week     Comment: rare wine     Drug use: No     PHYSICAL EXAM     INITIAL VITALS: BP (!) 149/92   Pulse 85   Temp 97.9 °F (36.6 °C) (Oral)   Resp 18   Ht (!) 5\" (0.127 m)   Wt 216 lb (98 kg)   SpO2 98%   BMI 6074.58 kg/m²    Physical Exam  Vitals and nursing note reviewed. Constitutional:       General: She is not in acute distress. Appearance: She is obese.  She is not ill-appearing, toxic-appearing or diaphoretic. HENT:      Head: Normocephalic and atraumatic. Eyes:      General: No scleral icterus. Extraocular Movements: Extraocular movements intact. Pupils: Pupils are equal, round, and reactive to light. Cardiovascular:      Rate and Rhythm: Normal rate and regular rhythm. Pulses: Normal pulses. Heart sounds: Normal heart sounds. Pulmonary:      Effort: Pulmonary effort is normal. No respiratory distress. Breath sounds: Normal breath sounds. Abdominal:      General: There is no distension. Palpations: Abdomen is soft. There is no mass. Tenderness: There is no abdominal tenderness. There is no guarding or rebound. Musculoskeletal:         General: No deformity. Normal range of motion. Cervical back: Normal range of motion and neck supple. No rigidity. Right lower leg: No edema. Left lower leg: No edema. Skin:     General: Skin is warm and dry. Capillary Refill: Capillary refill takes less than 2 seconds. Neurological:      General: No focal deficit present. Mental Status: She is alert and oriented to person, place, and time. Psychiatric:         Thought Content: Thought content normal.         MEDICAL DECISION MAKING:          Please see ED Course below for MDM/ED course. DDx: PE, pneumonia, rib fracture, cholecystitis, kidney stone    All patient's question's and concerns were answered prior to disposition and patient and/or family expressed understanding and agreement of treatment plan.        NIH STROKE SCALE:            PROCEDURES:    Procedures    DIAGNOSTIC RESULTS   EKG:All EKG's are interpreted by the Emergency Department Physician who either signs or Co-signs this chart in the absence of a cardiologist.    Normal sinus rhythm rate of 74 normal intervals normal axis no ST elevations or depressions there is T wave inversion lead III, Q waves V5 V6 recovered fairly, good R wave progression, abnormal EKG    RADIOLOGY:All plain medications while in the emergency department:  Orders Placed This Encounter   Medications    ketorolac (TORADOL) injection 30 mg    ibuprofen (ADVIL;MOTRIN) 800 MG tablet     Sig: Take 1 tablet by mouth every 8 hours as needed for Pain     Dispense:  30 tablet     Refill:  0    cyclobenzaprine (FLEXERIL) 10 MG tablet     Sig: Take 1 tablet by mouth nightly as needed for Muscle spasms     Dispense:  10 tablet     Refill:  0     CONSULTS:  None    FINAL IMPRESSION      1. Chest pain, unspecified type    2. Back strain, initial encounter          DISPOSITION/PLAN   DISPOSITION decision to discharge      PATIENT REFERRED TO:  Shannon Morrow DO  [address]  [phone]    Schedule an appointment as soon as possible for a visit       [facility] ED  [address] [facility]  [phone]    If symptoms worsen    DISCHARGE MEDICATIONS:  New Prescriptions    CYCLOBENZAPRINE (FLEXERIL) 10 MG TABLET    Take 1 tablet by mouth nightly as needed for Muscle spasms    IBUPROFEN (ADVIL;MOTRIN) 800 MG TABLET    Take 1 tablet by mouth every 8 hours as needed for Pain     Duncan Blue MD  Attending Emergency Physician    This note was created with the assistance of a speech-recognition program. While intending to generate a document that actually reflects the content of the visit, no guarantees can be provided that every mistake has been identified and corrected by editing.                     Duncan Blue MD  06/16/21 5606

## 2021-06-17 LAB
EKG ATRIAL RATE: 74 BPM
EKG P AXIS: 71 DEGREES
EKG P-R INTERVAL: 188 MS
EKG Q-T INTERVAL: 392 MS
EKG QRS DURATION: 84 MS
EKG QTC CALCULATION (BAZETT): 435 MS
EKG R AXIS: 37 DEGREES
EKG T AXIS: 31 DEGREES
EKG VENTRICULAR RATE: 74 BPM

## 2021-06-17 PROCEDURE — 93010 ELECTROCARDIOGRAM REPORT: CPT | Performed by: INTERNAL MEDICINE

## 2022-01-05 ENCOUNTER — HOSPITAL ENCOUNTER (OUTPATIENT)
Dept: MAMMOGRAPHY | Facility: CLINIC | Age: 47
Discharge: HOME | End: 2022-01-07

## 2022-01-05 DIAGNOSIS — Z12.31 VISIT FOR SCREENING MAMMOGRAM: ICD-10-CM

## 2022-01-31 ENCOUNTER — HOSPITAL ENCOUNTER (OUTPATIENT)
Dept: MAMMOGRAPHY | Facility: CLINIC | Age: 47
Discharge: HOME | End: 2022-02-02
Payer: COMMERCIAL

## 2022-01-31 DIAGNOSIS — Z12.31 SCREENING MAMMOGRAM FOR BREAST CANCER: ICD-10-CM

## 2022-01-31 PROCEDURE — 77067 SCR MAMMO BI INCL CAD: CPT

## 2022-11-01 ENCOUNTER — APPOINTMENT (OUTPATIENT)
Dept: GENERAL RADIOLOGY | Age: 47
End: 2022-11-01
Payer: COMMERCIAL

## 2022-11-01 ENCOUNTER — HOSPITAL ENCOUNTER (OUTPATIENT)
Age: 47
Setting detail: OBSERVATION
Discharge: HOME | End: 2022-11-01
Attending: EMERGENCY MEDICINE | Admitting: FAMILY MEDICINE
Payer: COMMERCIAL

## 2022-11-01 ENCOUNTER — APPOINTMENT (OUTPATIENT)
Dept: NUCLEAR MEDICINE | Age: 47
End: 2022-11-01
Payer: COMMERCIAL

## 2022-11-01 VITALS
RESPIRATION RATE: 16 BRPM | HEART RATE: 99 BPM | SYSTOLIC BLOOD PRESSURE: 152 MMHG | DIASTOLIC BLOOD PRESSURE: 97 MMHG | TEMPERATURE: 97.7 F | WEIGHT: 220.4 LBS | BODY MASS INDEX: 36.72 KG/M2 | HEIGHT: 65 IN | OXYGEN SATURATION: 99 %

## 2022-11-01 DIAGNOSIS — R07.9 CHEST PAIN, UNSPECIFIED TYPE: Primary | ICD-10-CM

## 2022-11-01 PROBLEM — E78.5 HYPERLIPIDEMIA: Status: ACTIVE | Noted: 2022-11-01

## 2022-11-01 PROBLEM — M06.9 RA (RHEUMATOID ARTHRITIS) (HCC): Status: ACTIVE | Noted: 2022-11-01

## 2022-11-01 LAB
ABSOLUTE EOS #: 0.13 K/UL (ref 0–0.44)
ABSOLUTE IMMATURE GRANULOCYTE: 0.01 K/UL (ref 0–0.3)
ABSOLUTE LYMPH #: 1.87 K/UL (ref 1.1–3.7)
ABSOLUTE MONO #: 0.4 K/UL (ref 0.1–1.2)
ANION GAP SERPL CALCULATED.3IONS-SCNC: 11 MMOL/L (ref 9–17)
BASOPHILS # BLD: 0 % (ref 0–2)
BASOPHILS ABSOLUTE: <0.03 K/UL (ref 0–0.2)
BUN BLDV-MCNC: 13 MG/DL (ref 6–20)
BUN/CREAT BLD: 13 (ref 9–20)
CALCIUM SERPL-MCNC: 9.6 MG/DL (ref 8.6–10.4)
CHLORIDE BLD-SCNC: 105 MMOL/L (ref 98–107)
CO2: 25 MMOL/L (ref 20–31)
CREAT SERPL-MCNC: 1 MG/DL (ref 0.5–0.9)
D-DIMER QUANTITATIVE: 0.53 MG/L FEU (ref 0–0.59)
EOSINOPHILS RELATIVE PERCENT: 2 % (ref 1–4)
GFR SERPL CREATININE-BSD FRML MDRD: >60 ML/MIN/1.73M2
GLUCOSE BLD-MCNC: 108 MG/DL (ref 70–99)
HCG QUALITATIVE: NEGATIVE
HCT VFR BLD CALC: 39.9 % (ref 36.3–47.1)
HEMOGLOBIN: 12.5 G/DL (ref 11.9–15.1)
IMMATURE GRANULOCYTES: 0 %
LV EF: 70 %
LVEF MODALITY: NORMAL
LYMPHOCYTES # BLD: 31 % (ref 24–43)
MCH RBC QN AUTO: 29.1 PG (ref 25.2–33.5)
MCHC RBC AUTO-ENTMCNC: 31.3 G/DL (ref 28.4–34.8)
MCV RBC AUTO: 93 FL (ref 82.6–102.9)
MONOCYTES # BLD: 7 % (ref 3–12)
NRBC AUTOMATED: 0 PER 100 WBC
PDW BLD-RTO: 14.3 % (ref 11.8–14.4)
PLATELET # BLD: 314 K/UL (ref 138–453)
PMV BLD AUTO: 9.3 FL (ref 8.1–13.5)
POTASSIUM SERPL-SCNC: 3.8 MMOL/L (ref 3.7–5.3)
RBC # BLD: 4.29 M/UL (ref 3.95–5.11)
SEG NEUTROPHILS: 60 % (ref 36–65)
SEGMENTED NEUTROPHILS ABSOLUTE COUNT: 3.53 K/UL (ref 1.5–8.1)
SODIUM BLD-SCNC: 141 MMOL/L (ref 135–144)
TROPONIN, HIGH SENSITIVITY: <6 NG/L (ref 0–14)
TROPONIN, HIGH SENSITIVITY: <6 NG/L (ref 0–14)
WBC # BLD: 6 K/UL (ref 3.5–11.3)

## 2022-11-01 PROCEDURE — 85025 COMPLETE CBC W/AUTO DIFF WBC: CPT

## 2022-11-01 PROCEDURE — 93005 ELECTROCARDIOGRAM TRACING: CPT | Performed by: EMERGENCY MEDICINE

## 2022-11-01 PROCEDURE — 99219 PR INITIAL OBSERVATION CARE/DAY 50 MINUTES: CPT | Performed by: NURSE PRACTITIONER

## 2022-11-01 PROCEDURE — 96374 THER/PROPH/DIAG INJ IV PUSH: CPT

## 2022-11-01 PROCEDURE — G0378 HOSPITAL OBSERVATION PER HR: HCPCS

## 2022-11-01 PROCEDURE — 6370000000 HC RX 637 (ALT 250 FOR IP): Performed by: EMERGENCY MEDICINE

## 2022-11-01 PROCEDURE — 84484 ASSAY OF TROPONIN QUANT: CPT

## 2022-11-01 PROCEDURE — 78452 HT MUSCLE IMAGE SPECT MULT: CPT

## 2022-11-01 PROCEDURE — 85379 FIBRIN DEGRADATION QUANT: CPT

## 2022-11-01 PROCEDURE — 6360000002 HC RX W HCPCS: Performed by: NURSE PRACTITIONER

## 2022-11-01 PROCEDURE — 96372 THER/PROPH/DIAG INJ SC/IM: CPT

## 2022-11-01 PROCEDURE — 93017 CV STRESS TEST TRACING ONLY: CPT

## 2022-11-01 PROCEDURE — 99285 EMERGENCY DEPT VISIT HI MDM: CPT

## 2022-11-01 PROCEDURE — 3430000000 HC RX DIAGNOSTIC RADIOPHARMACEUTICAL: Performed by: NURSE PRACTITIONER

## 2022-11-01 PROCEDURE — 6370000000 HC RX 637 (ALT 250 FOR IP): Performed by: NURSE PRACTITIONER

## 2022-11-01 PROCEDURE — 36415 COLL VENOUS BLD VENIPUNCTURE: CPT

## 2022-11-01 PROCEDURE — 6360000002 HC RX W HCPCS: Performed by: EMERGENCY MEDICINE

## 2022-11-01 PROCEDURE — 80048 BASIC METABOLIC PNL TOTAL CA: CPT

## 2022-11-01 PROCEDURE — A9500 TC99M SESTAMIBI: HCPCS | Performed by: NURSE PRACTITIONER

## 2022-11-01 PROCEDURE — 2580000003 HC RX 258: Performed by: NURSE PRACTITIONER

## 2022-11-01 PROCEDURE — 84703 CHORIONIC GONADOTROPIN ASSAY: CPT

## 2022-11-01 PROCEDURE — 71045 X-RAY EXAM CHEST 1 VIEW: CPT

## 2022-11-01 RX ORDER — MORPHINE SULFATE 4 MG/ML
4 INJECTION, SOLUTION INTRAMUSCULAR; INTRAVENOUS ONCE
Status: COMPLETED | OUTPATIENT
Start: 2022-11-01 | End: 2022-11-01

## 2022-11-01 RX ORDER — CYCLOBENZAPRINE HCL 10 MG
10 TABLET ORAL 3 TIMES DAILY PRN
Qty: 30 TABLET | Refills: 0 | Status: SHIPPED | OUTPATIENT
Start: 2022-11-01 | End: 2022-11-11

## 2022-11-01 RX ORDER — ASPIRIN 81 MG/1
324 TABLET, CHEWABLE ORAL ONCE
Status: COMPLETED | OUTPATIENT
Start: 2022-11-01 | End: 2022-11-01

## 2022-11-01 RX ORDER — VITAMIN B COMPLEX
2000 TABLET ORAL DAILY
Status: DISCONTINUED | OUTPATIENT
Start: 2022-11-01 | End: 2022-11-01 | Stop reason: HOSPADM

## 2022-11-01 RX ORDER — ACETAMINOPHEN 325 MG/1
650 TABLET ORAL EVERY 6 HOURS PRN
Status: DISCONTINUED | OUTPATIENT
Start: 2022-11-01 | End: 2022-11-01 | Stop reason: HOSPADM

## 2022-11-01 RX ORDER — MAGNESIUM SULFATE 1 G/100ML
1000 INJECTION INTRAVENOUS PRN
Status: DISCONTINUED | OUTPATIENT
Start: 2022-11-01 | End: 2022-11-01 | Stop reason: HOSPADM

## 2022-11-01 RX ORDER — SODIUM CHLORIDE 9 MG/ML
INJECTION, SOLUTION INTRAVENOUS PRN
Status: DISCONTINUED | OUTPATIENT
Start: 2022-11-01 | End: 2022-11-01 | Stop reason: HOSPADM

## 2022-11-01 RX ORDER — SODIUM CHLORIDE 0.9 % (FLUSH) 0.9 %
5-40 SYRINGE (ML) INJECTION PRN
Status: ACTIVE | OUTPATIENT
Start: 2022-11-01 | End: 2022-11-01

## 2022-11-01 RX ORDER — BUDESONIDE AND FORMOTEROL FUMARATE DIHYDRATE 160; 4.5 UG/1; UG/1
2 AEROSOL RESPIRATORY (INHALATION) 2 TIMES DAILY
Status: DISCONTINUED | OUTPATIENT
Start: 2022-11-01 | End: 2022-11-01 | Stop reason: HOSPADM

## 2022-11-01 RX ORDER — SODIUM CHLORIDE 9 MG/ML
500 INJECTION, SOLUTION INTRAVENOUS CONTINUOUS PRN
Status: ACTIVE | OUTPATIENT
Start: 2022-11-01 | End: 2022-11-01

## 2022-11-01 RX ORDER — MECLIZINE HCL 12.5 MG/1
12.5 TABLET ORAL 3 TIMES DAILY PRN
Status: DISCONTINUED | OUTPATIENT
Start: 2022-11-01 | End: 2022-11-01 | Stop reason: HOSPADM

## 2022-11-01 RX ORDER — METOPROLOL TARTRATE 5 MG/5ML
5 INJECTION INTRAVENOUS EVERY 5 MIN PRN
Status: ACTIVE | OUTPATIENT
Start: 2022-11-01 | End: 2022-11-01

## 2022-11-01 RX ORDER — SODIUM CHLORIDE 0.9 % (FLUSH) 0.9 %
10 SYRINGE (ML) INJECTION PRN
Status: DISCONTINUED | OUTPATIENT
Start: 2022-11-01 | End: 2022-11-01 | Stop reason: HOSPADM

## 2022-11-01 RX ORDER — ONDANSETRON 4 MG/1
4 TABLET, ORALLY DISINTEGRATING ORAL EVERY 8 HOURS PRN
Status: DISCONTINUED | OUTPATIENT
Start: 2022-11-01 | End: 2022-11-01 | Stop reason: HOSPADM

## 2022-11-01 RX ORDER — ONDANSETRON 2 MG/ML
4 INJECTION INTRAMUSCULAR; INTRAVENOUS EVERY 6 HOURS PRN
Status: DISCONTINUED | OUTPATIENT
Start: 2022-11-01 | End: 2022-11-01 | Stop reason: HOSPADM

## 2022-11-01 RX ORDER — SODIUM CHLORIDE 0.9 % (FLUSH) 0.9 %
5-40 SYRINGE (ML) INJECTION EVERY 12 HOURS SCHEDULED
Status: DISCONTINUED | OUTPATIENT
Start: 2022-11-01 | End: 2022-11-01 | Stop reason: HOSPADM

## 2022-11-01 RX ORDER — ATROPINE SULFATE 0.1 MG/ML
0.5 INJECTION INTRAVENOUS EVERY 5 MIN PRN
Status: ACTIVE | OUTPATIENT
Start: 2022-11-01 | End: 2022-11-01

## 2022-11-01 RX ORDER — CYCLOBENZAPRINE HCL 10 MG
10 TABLET ORAL 3 TIMES DAILY PRN
Status: DISCONTINUED | OUTPATIENT
Start: 2022-11-01 | End: 2022-11-01 | Stop reason: HOSPADM

## 2022-11-01 RX ORDER — POTASSIUM CHLORIDE 20 MEQ/1
40 TABLET, EXTENDED RELEASE ORAL PRN
Status: DISCONTINUED | OUTPATIENT
Start: 2022-11-01 | End: 2022-11-01 | Stop reason: HOSPADM

## 2022-11-01 RX ORDER — METOPROLOL SUCCINATE 25 MG/1
25 TABLET, EXTENDED RELEASE ORAL DAILY
Status: DISCONTINUED | OUTPATIENT
Start: 2022-11-02 | End: 2022-11-01 | Stop reason: HOSPADM

## 2022-11-01 RX ORDER — ACETAMINOPHEN 650 MG/1
650 SUPPOSITORY RECTAL EVERY 6 HOURS PRN
Status: DISCONTINUED | OUTPATIENT
Start: 2022-11-01 | End: 2022-11-01 | Stop reason: HOSPADM

## 2022-11-01 RX ORDER — POTASSIUM CHLORIDE 7.45 MG/ML
10 INJECTION INTRAVENOUS PRN
Status: DISCONTINUED | OUTPATIENT
Start: 2022-11-01 | End: 2022-11-01 | Stop reason: HOSPADM

## 2022-11-01 RX ORDER — NITROGLYCERIN 0.4 MG/1
0.4 TABLET SUBLINGUAL EVERY 5 MIN PRN
Status: ACTIVE | OUTPATIENT
Start: 2022-11-01 | End: 2022-11-01

## 2022-11-01 RX ORDER — HYDROCHLOROTHIAZIDE 25 MG/1
25 TABLET ORAL EVERY MORNING
Qty: 90 TABLET | Refills: 1 | Status: SHIPPED | OUTPATIENT
Start: 2022-11-01

## 2022-11-01 RX ORDER — FOLIC ACID 1 MG/1
1 TABLET ORAL DAILY
Qty: 90 TABLET | Refills: 1 | Status: SHIPPED | OUTPATIENT
Start: 2022-11-01

## 2022-11-01 RX ORDER — POLYETHYLENE GLYCOL 3350 17 G/17G
17 POWDER, FOR SOLUTION ORAL DAILY PRN
Status: DISCONTINUED | OUTPATIENT
Start: 2022-11-01 | End: 2022-11-01 | Stop reason: HOSPADM

## 2022-11-01 RX ORDER — ALBUTEROL SULFATE 90 UG/1
2 AEROSOL, METERED RESPIRATORY (INHALATION) PRN
Status: DISCONTINUED | OUTPATIENT
Start: 2022-11-01 | End: 2022-11-01

## 2022-11-01 RX ORDER — ALBUTEROL SULFATE 2.5 MG/3ML
2.5 SOLUTION RESPIRATORY (INHALATION) EVERY 6 HOURS PRN
Status: DISCONTINUED | OUTPATIENT
Start: 2022-11-01 | End: 2022-11-01

## 2022-11-01 RX ORDER — ENOXAPARIN SODIUM 100 MG/ML
40 INJECTION SUBCUTANEOUS DAILY
Status: DISCONTINUED | OUTPATIENT
Start: 2022-11-01 | End: 2022-11-01 | Stop reason: HOSPADM

## 2022-11-01 RX ORDER — ALBUTEROL SULFATE 90 UG/1
2 AEROSOL, METERED RESPIRATORY (INHALATION) EVERY 6 HOURS PRN
Status: DISCONTINUED | OUTPATIENT
Start: 2022-11-01 | End: 2022-11-01 | Stop reason: HOSPADM

## 2022-11-01 RX ORDER — POTASSIUM CHLORIDE 20 MEQ/1
20 TABLET, EXTENDED RELEASE ORAL DAILY
Status: DISCONTINUED | OUTPATIENT
Start: 2022-11-01 | End: 2022-11-01 | Stop reason: HOSPADM

## 2022-11-01 RX ORDER — PANTOPRAZOLE SODIUM 40 MG/1
40 TABLET, DELAYED RELEASE ORAL
Qty: 90 TABLET | Refills: 1 | Status: SHIPPED | OUTPATIENT
Start: 2022-11-01

## 2022-11-01 RX ADMIN — ENOXAPARIN SODIUM 40 MG: 100 INJECTION SUBCUTANEOUS at 13:38

## 2022-11-01 RX ADMIN — TETRAKIS(2-METHOXYISOBUTYLISOCYANIDE)COPPER(I) TETRAFLUOROBORATE 40.5 MILLICURIE: 1 INJECTION, POWDER, LYOPHILIZED, FOR SOLUTION INTRAVENOUS at 12:00

## 2022-11-01 RX ADMIN — SODIUM CHLORIDE, PRESERVATIVE FREE 10 ML: 5 INJECTION INTRAVENOUS at 09:21

## 2022-11-01 RX ADMIN — ASPIRIN 81 MG 324 MG: 81 TABLET ORAL at 05:40

## 2022-11-01 RX ADMIN — MORPHINE SULFATE 4 MG: 4 INJECTION, SOLUTION INTRAMUSCULAR; INTRAVENOUS at 05:40

## 2022-11-01 RX ADMIN — Medication 2000 UNITS: at 13:38

## 2022-11-01 RX ADMIN — SODIUM CHLORIDE 500 ML: 9 INJECTION, SOLUTION INTRAVENOUS at 09:20

## 2022-11-01 RX ADMIN — TETRAKIS(2-METHOXYISOBUTYLISOCYANIDE)COPPER(I) TETRAFLUOROBORATE 16.2 MILLICURIE: 1 INJECTION, POWDER, LYOPHILIZED, FOR SOLUTION INTRAVENOUS at 09:25

## 2022-11-01 ASSESSMENT — ENCOUNTER SYMPTOMS
WHEEZING: 0
SINUS PRESSURE: 0
DIARRHEA: 0
SHORTNESS OF BREATH: 1
BACK PAIN: 0
CONSTIPATION: 0
BACK PAIN: 1
ABDOMINAL PAIN: 0
COUGH: 0
PHOTOPHOBIA: 0
VOMITING: 0
NAUSEA: 0
CHEST TIGHTNESS: 1
SINUS PAIN: 0

## 2022-11-01 ASSESSMENT — PAIN - FUNCTIONAL ASSESSMENT: PAIN_FUNCTIONAL_ASSESSMENT: 0-10

## 2022-11-01 ASSESSMENT — PAIN SCALES - GENERAL: PAINLEVEL_OUTOF10: 9

## 2022-11-01 ASSESSMENT — PAIN DESCRIPTION - LOCATION: LOCATION: CHEST

## 2022-11-01 ASSESSMENT — HEART SCORE: ECG: 0

## 2022-11-01 NOTE — ED NOTES
Patient presents to ED with complaints of midsternal chest pain. Pain started this morning when she was getting ready for work. Denies cardiac hx, reports hx of high cholesterol and hypertension.       Herber Palmer RN  11/01/22 7659       Herber Palmer RN  11/01/22 5906

## 2022-11-01 NOTE — ED PROVIDER NOTES
Medical Decision Making: The patient was initially seen by Dr. Dorian Gastelum and signed out to me after discussing the case with her thoroughly. Initial troponin is negative. Her symptoms are concerning. She will be admitted for observation. Treatment diagnosis and disposition were discussed with the patient. CONSULTS:  None    PROCEDURES:  None    FINAL IMPRESSION      1. Chest pain, unspecified type         DISPOSITION/PLAN   DISPOSITION Decision To Admit 11/01/2022 07:06:25 AM       PATIENT REFERRED TO:   No follow-up provider specified.     DISCHARGE MEDICATIONS:     New Prescriptions    No medications on file         (Please note that portions of this note were completed with a voice recognition program.  Efforts were made to edit the dictations but occasionally words are mis-transcribed.)    Ronny Pollard MD  Attending Emergency Physician         Ronny Pollard MD  11/01/22 9980

## 2022-11-01 NOTE — H&P
[facility]  Office: [address], DO, Misael Ards, DO, Yumi Nassau, DO, Gerald Moseley Blood, DO, Cameron Welsh MD, Arely Vera MD, Dalia Howell MD, Haider Iglesias MD,  Woo Whiting MD, Kimberly Appiah MD, Tony Bueno, DO, Viviana Tate MD,  Tita Gould MD, Asia Goss MD, Tori Moritz, DO, Apple Salazar MD, Tamiko Lares MD, Cecil Buerger, DO, Jerad Duran MD, Shekhar Hyatt MD, Taco Jacobs MD, Jose Manuel Keith MD, Alan Ramires DO, Osbaldo Sue MD, Monet Elizondo MD, Rumalda Snellen, CNP,  Katie Machado, CNP, Yoan Giang, CNP, Eli Carson, CNP,  Buffy Patton, [facility], Heaven Tinoco, CNP, Donya Temple, CNP, Carol Jessica, CNP, Eliz Matthew, CNP, Nicole Valdes, CNP, Yazan Jackman PA-LUDIVINA, Billy Faulkner, CNS, Sylwia Rivero, [facility], Christie Cano, CNP, Kenisha Lindsey, CNP, Na Cross, [facility]    HISTORY AND PHYSICAL EXAMINATION            Date:   11/1/2022  Patient name:  Alannah Dior  Date of admission:  11/1/2022  4:56 AM  MRN:   [id number]  Account:  [de-identified]  YOB: 1975  PCP:    Pramod Thomas MD  Room:   [facility]1008-  Code Status:    Full Code    Chief Complaint:     Chief Complaint   Patient presents with    Chest Pain     Started this am when pt got up for work    Shortness of Breath       History Obtained From:     patient    History of Present Illness:     Alannah Dior is a 50 y.o. Non- / non  female who presents with Chest Pain (Started this am when pt got up for work) and Shortness of Breath   and is admitted to the hospital for the management of Chest pain.     Patient reports that this morning while she was getting dressed for work she suddenly had onset of right-sided chest pain described as a tearing pressure directly under her right breast.  Patient has no known history of CAD however she reports she has a strong family history and does have a personal history of hyperlipidemia and hypertension. Patient was recently started on methotrexate at an increased dose for a new diagnosis of rheumatoid arthritis. Patient is also been under the care of nephrology for worsening CKD. At time my exam patient is resting comfortably. She reports that her chest pain is persistent and alleviated or exacerbated by nothing. She rates it at a 3/10. Options for care are discussed with the patient and she would like to pursue outpatient treatment if medically safe to do so. Cardiac stress test can be completed today and this will be done and if normal she can be discharged to home with instructions to follow-up with her PCP and various specialist as an outpatient. Past Medical History:     Past Medical History:   Diagnosis Date    Asthma     Headache     Hyperlipidemia     Hypertension     Insomnia     RA (rheumatoid arthritis) ([facility] [address]         Past Surgical History:     Past Surgical History:   Procedure Laterality Date    TUBAL LIGATION  1998        Medications Prior to Admission:     Prior to Admission medications    Medication Sig Start Date End Date Taking? Authorizing Provider   metoprolol succinate (TOPROL XL) 25 MG extended release tablet TAKE 1 TABLET BY MOUTH EVERY DAY 8/5/22   Ihsan Stinson,    cyclobenzaprine (FLEXERIL) 5 MG tablet take 2 tablets (10 mg total) by mouth 3 times a day as needed for muscle spasms for up to 30 days.   Patient not taking: Reported on 11/1/2022 11/29/21   Historical Provider, MD   meclizine (ANTIVERT) 12.5 MG tablet Take 12.5 mg by mouth 3 times daily as needed  Patient not taking: Reported on 11/1/2022 2/7/22   Historical Provider, MD   SUMAtriptan (IMITREX) 50 MG tablet TAKE 1 TABLET BY MOUTH ONE TIME A DAY AS NEEDED FOR MIGRAINE 8/23/21   Ihsan Stinson DO   ibuprofen (ADVIL;MOTRIN) 800 MG tablet Take 1 tablet by mouth every 8 hours as needed for Pain  Patient not taking: Reported on 11/1/2022 6/16/21   Amandeep Paul MD   budesonide-formoterol [facility]) 160-4.5 MCG/ACT AERO Inhale 2 puffs into the lungs 2 times daily  Patient not taking: Reported on 11/1/2022 2/19/21   Yamilex Merino,    potassium chloride (KLOR-CON M) 20 MEQ extended release tablet Take 1 tablet by mouth daily for 20 days 11/5/20 6/16/21  Judd Romero MD   albuterol (PROVENTIL) (2.5 MG/3ML) 0.083% nebulizer solution Take 3 mLs by nebulization every 6 hours as needed for Wheezing 4/6/20   Yamilex Merino DO   albuterol sulfate HFA (PROAIR HFA) 108 (90 Base) MCG/ACT inhaler Inhale 2 puffs into the lungs every 6 hours as needed for Wheezing 3/30/20   Alexandro Stinson DO   albuterol (PROVENTIL) (5 MG/ML) 0.5% nebulizer solution Take 1 mL by nebulization 4 times daily as needed for Wheezing 5/30/19   Alexandro Stanton,    PREMPRO 0.3-1.5 MG per tablet  2/25/19   Historical Provider, MD   Spacer/Aero-Holding [facility] 1 Device by Does not apply route daily as needed (wheezing) 2/25/19   Yamilex Merino, DO   Cholecalciferol (VITAMIN D3) 2000 UNITS CAPS Take 1 capsule by mouth daily    Historical Provider, MD        Allergies:     Tramadol    Social History:     Tobacco:    reports that she has never smoked. She has never used smokeless tobacco.  Alcohol:      reports current alcohol use of about 1.0 standard drink per week. Drug Use:  reports no history of drug use. Family History:     Family History   Problem Relation Age of Onset    Heart Failure Mother     Other Father         MI    Hypertension Brother        Review of Systems:     Positive and Negative as described in HPI. Review of Systems   Constitutional:  Negative for activity change, chills, fatigue and fever. HENT:  Negative for sinus pressure and sinus pain. Eyes:  Negative for photophobia and visual disturbance. Respiratory:  Positive for shortness of breath. Negative for cough and wheezing. Cardiovascular:  Positive for chest pain. Negative for palpitations and leg swelling. Gastrointestinal:  Negative for abdominal pain, constipation, diarrhea, nausea and vomiting. Endocrine: Negative for cold intolerance, heat intolerance and polyuria. Genitourinary:  Negative for difficulty urinating and urgency. Musculoskeletal:  Positive for back pain and neck pain. Negative for arthralgias and myalgias. Skin: Negative. Negative for wound. Neurological:  Negative for dizziness, syncope, weakness and light-headedness. Hematological:  Negative for adenopathy. Does not bruise/bleed easily. Psychiatric/Behavioral:  Negative for agitation and confusion. The patient is not nervous/anxious. Physical Exam:   BP (!) 152/97   Pulse 99   Temp 97.7 °F (36.5 °C) (Oral)   Resp 16   Ht 5' 5\" (1.651 m)   Wt 220 lb 6.4 oz (100 kg)   SpO2 99%   BMI 36.68 kg/m²   Temp (24hrs), Av °F (36.7 °C), Min:97.7 °F (36.5 °C), Max:98.3 °F (36.8 °C)    No results for input(s): POCGLU in the last 72 hours. No intake or output data in the 24 hours ending 22 1304    Physical Exam  Constitutional:       General: She is not in acute distress. Appearance: Normal appearance. She is normal weight. She is not toxic-appearing. HENT:      Head: Normocephalic and atraumatic. Mouth/Throat:      Mouth: Mucous membranes are moist.   Eyes:      Extraocular Movements: Extraocular movements intact. Pupils: Pupils are equal, round, and reactive to light. Cardiovascular:      Rate and Rhythm: Normal rate and regular rhythm. Pulses: Normal pulses. Heart sounds: Normal heart sounds. No murmur heard. Pulmonary:      Effort: Pulmonary effort is normal. No respiratory distress. Abdominal:      General: Abdomen is flat. Bowel sounds are normal. There is no distension. Palpations: Abdomen is soft. Tenderness: There is no abdominal tenderness. Musculoskeletal:         General: No swelling or tenderness. Normal range of motion. Cervical back: Normal range of motion and neck supple. Skin:     General: Skin is warm and dry. Capillary Refill: Capillary refill takes less than 2 seconds. Coloration: Skin is not pale. Neurological:      General: No focal deficit present. Mental Status: She is alert and oriented to person, place, and time. Mental status is at baseline. Psychiatric:         Mood and Affect: Mood normal.         Behavior: Behavior normal.         Thought Content:  Thought content normal.         Judgment: Judgment normal.       Investigations:      Laboratory Testing:  Recent Results (from the past 24 hour(s))   EKG 12 Lead    Collection Time: 11/01/22  5:03 AM   Result Value Ref Range    Ventricular Rate 95 BPM    Atrial Rate 95 BPM    P-R Interval 180 ms    QRS Duration 74 ms    Q-T Interval 344 ms    QTc Calculation (Bazett) 432 ms    P Axis 65 degrees    R Axis 41 degrees    T Axis 21 degrees   CBC with Auto Differential    Collection Time: 11/01/22  5:13 AM   Result Value Ref Range    WBC 6.0 3.5 - 11.3 k/uL    RBC 4.29 3.95 - 5.11 m/uL    Hemoglobin 12.5 11.9 - 15.1 g/dL    Hematocrit 39.9 36.3 - 47.1 %    MCV 93.0 82.6 - 102.9 fL    MCH 29.1 25.2 - 33.5 pg    MCHC 31.3 28.4 - 34.8 g/dL    RDW 14.3 11.8 - 14.4 %    Platelets 943 519 - 130 k/uL    MPV 9.3 8.1 - 13.5 fL    NRBC Automated 0.0 0.0 per 100 WBC    Seg Neutrophils 60 36 - 65 %    Lymphocytes 31 24 - 43 %    Monocytes 7 3 - 12 %    Eosinophils % 2 1 - 4 %    Basophils 0 0 - 2 %    Immature Granulocytes 0 0 %    Segs Absolute 3.53 1.50 - 8.10 k/uL    Absolute Lymph # 1.87 1.10 - 3.70 k/uL    Absolute Mono # 0.40 0.10 - 1.20 k/uL    Absolute Eos # 0.13 0.00 - 0.44 k/uL    Basophils Absolute <0.03 0.00 - 0.20 k/uL    Absolute Immature Granulocyte 0.01 0.00 - 0.30 k/uL   Basic Metabolic Panel    Collection Time: 11/01/22  5:13 AM   Result Value Ref Range    Glucose 108 (H) 70 - 99 mg/dL    BUN 13 6 - 20 mg/dL    Creatinine 1.00 (H) 0.50 - 0.90 mg/dL Est, Glom Filt Rate >60 >60 mL/min/1.73m2    Bun/Cre Ratio 13 9 - 20    Calcium 9.6 8.6 - 10.4 mg/dL    Sodium 141 135 - 144 mmol/L    Potassium 3.8 3.7 - 5.3 mmol/L    Chloride 105 98 - 107 mmol/L    CO2 25 20 - 31 mmol/L    Anion Gap 11 9 - 17 mmol/L   Troponin    Collection Time: 11/01/22  5:13 AM   Result Value Ref Range    Troponin, High Sensitivity <6 0 - 14 ng/L   Troponin    Collection Time: 11/01/22  7:12 AM   Result Value Ref Range    Troponin, High Sensitivity <6 0 - 14 ng/L   HCG Qualitative, Serum    Collection Time: 11/01/22  8:41 AM   Result Value Ref Range    hCG Qual NEGATIVE NEGATIVE   D-Dimer, Quantitative    Collection Time: 11/01/22  8:41 AM   Result Value Ref Range    D-Dimer, Quant 0.53 0.00 - 0.59 mg/L FEU       Imaging/Diagnostics:  XR CHEST 1 VIEW    Result Date: 11/1/2022  No evidence of acute process.        Assessment :      Hospital Problems             Last Modified POA    * (Principal) Chest pain 11/1/2022 Yes    Hyperlipidemia 11/1/2022 Yes    RA (rheumatoid arthritis) ([address] 11/1/2022 Yes    Asthma 11/1/2022 Yes    Essential hypertension 11/1/2022 Yes    Pure hypercholesterolemia 11/1/2022 Yes       Plan:     Patient status observation in the  Med/Surge    Chest pain  Cardiac stress test  Check D-dimer  Essential hypertension hypercholesterolemia  Restart home medication regimen  Education on the need for diet lifestyle modification  Asthma without acute exacerbation  As needed albuterol    Consultations:   IP CONSULT TO HOSPITALIST      CHIP Rodriguez NP  11/1/2022  1:04 PM    Copy sent to Dr. Chuckie Cano MD

## 2022-11-01 NOTE — PROGRESS NOTES
All patient belongings collected. IV and telemetry removed. Discharge paperwork given and explained to patient. See discharge event for further details.

## 2022-11-01 NOTE — ED PROVIDER NOTES
[facility]  Emergency Department Encounter     Pt Name: Javad Murray  MRN: [id number]  [place] 1975  Date of evaluation: 11/1/22  PCP:  La Isaac MD    CHIEF COMPLAINT       Chief Complaint   Patient presents with    Chest Pain     Started this am when pt got up for work    Shortness of Breath       HISTORY OF PRESENT ILLNESS  (Location/Symptom, Timing/Onset, Context/Setting, Quality, Duration, Modifying Factors, Severity.)    Javad Murray is a 50 y.o. female who presents with sudden onset of midsternal and right-sided squeezing chest pain that radiates down her right arm associated with shortness of breath, diaphoresis. Patient states that the pain started spontaneously when she was getting up this morning getting ready for work. She is going to continue to try and go to work, however her  expressed concern and they decided to come here instead for further evaluation. She does have a history of high cholesterol, hypertension. No history of diabetes. Denies cigarette smoker. Has never had a formal cardiac work-up and does not see a cardiologist currently. PAST MEDICAL / SURGICAL / SOCIAL / FAMILY HISTORY    has a past medical history of Asthma, Headache, Hyperlipidemia, Hypertension, and Insomnia. has a past surgical history that includes Tubal ligation (1998). Social History     Socioeconomic History    Marital status: Single     Spouse name: Not on file    Number of children: Not on file    Years of education: Not on file    Highest education level: Not on file   Occupational History    Not on file   Tobacco Use    Smoking status: Never    Smokeless tobacco: Never   Substance and Sexual Activity    Alcohol use:  Yes     Alcohol/week: 1.0 standard drink     Types: 1 Glasses of wine per week     Comment: rare wine     Drug use: No    Sexual activity: Not on file   Other Topics Concern    Not on file   Social History Narrative    Not on file     Social Determinants of Health     Financial Resource Strain: Low Risk     Difficulty of Paying Living Expenses: Not hard at all   Food Insecurity: No Food Insecurity    Worried About Running Out of Food in the Last Year: Never true    Ran Out of Food in the Last Year: Never true   Transportation Needs: Not on file   Physical Activity: Not on file   Stress: Not on file   Social Connections: Not on file   Intimate Partner Violence: Not on file   Housing Stability: Not on file       Family History   Problem Relation Age of Onset    Other Father         MI    Hypertension Brother        Allergies:    Tramadol    Home Medications:  Prior to Admission medications    Medication Sig Start Date End Date Taking? Authorizing Provider   metoprolol succinate (TOPROL XL) 25 MG extended release tablet TAKE 1 TABLET BY MOUTH EVERY DAY 8/5/22   Mandy Stinson DO   cyclobenzaprine (FLEXERIL) 5 MG tablet take 2 tablets (10 mg total) by mouth 3 times a day as needed for muscle spasms for up to 30 days.  11/29/21   Historical Provider, MD   meclizine (ANTIVERT) 12.5 MG tablet Take 12.5 mg by mouth 3 times daily as needed 2/7/22   Historical Provider, MD   SUMAtriptan (IMITREX) 50 MG tablet TAKE 1 TABLET BY MOUTH ONE TIME A DAY AS NEEDED FOR MIGRAINE 8/23/21   Suzanna Stevens DO   ibuprofen (ADVIL;MOTRIN) 800 MG tablet Take 1 tablet by mouth every 8 hours as needed for Pain 6/16/21   Patricia Barrientos MD   budesonide-formoterol [facility]) 160-4.5 MCG/ACT AERO Inhale 2 puffs into the lungs 2 times daily 2/19/21   Suzanna Stevens DO   potassium chloride (KLOR-CON M) 20 MEQ extended release tablet Take 1 tablet by mouth daily for 20 days 11/5/20 6/16/21  Kim Marinelli MD   albuterol (PROVENTIL) (2.5 MG/3ML) 0.083% nebulizer solution Take 3 mLs by nebulization every 6 hours as needed for Wheezing 4/6/20   Mandy Stinson DO   albuterol sulfate HFA (PROAIR HFA) 108 (90 Base) MCG/ACT inhaler Inhale 2 puffs into the lungs every 6 hours as needed for Wheezing 3/30/20   Ceasar Moritz Dickman, DO   albuterol (PROVENTIL) (5 MG/ML) 0.5% nebulizer solution Take 1 mL by nebulization 4 times daily as needed for Wheezing 5/30/19   Ceasar Moritz [place], DO   PREMPRO 0.3-1.5 MG per tablet  2/25/19   Historical Provider, MD   Spacer/Aero-Holding [place] 1 Device by Does not apply route daily as needed (wheezing) 2/25/19   Borup Burn, DO   Cholecalciferol (VITAMIN D3) 2000 UNITS CAPS Take 1 capsule by mouth daily    Historical Provider, MD       REVIEW OF SYSTEMS    (2-9 systems for level 4, 10 or more for level 5)    Review of Systems   Constitutional:  Positive for diaphoresis. Respiratory:  Positive for chest tightness and shortness of breath. Cardiovascular:  Positive for chest pain. Gastrointestinal:  Negative for nausea and vomiting. Genitourinary:  Negative for flank pain. Musculoskeletal:  Positive for myalgias. Negative for back pain. Neurological:  Negative for dizziness, syncope, weakness and light-headedness. All other systems reviewed and are negative. PHYSICAL EXAM   (up to 7 for level 4, 8 or more for level 5)    VITALS:   Vitals:    11/01/22 0530 11/01/22 0545 11/01/22 0600 11/01/22 0615   BP: (!) 156/95 (!) 139/93 135/88 138/84   Pulse: 89 87 84 88   Resp: 29 24 17 18   Temp:       TempSrc:       SpO2:  99%     Weight:           Physical Exam  Vitals and nursing note reviewed. Constitutional:       General: She is not in acute distress. Appearance: She is well-developed. She is obese. She is not diaphoretic. HENT:      Head: Normocephalic and atraumatic. Eyes:      Conjunctiva/sclera: Conjunctivae normal.   Cardiovascular:      Rate and Rhythm: Normal rate and regular rhythm. Heart sounds: Normal heart sounds. Pulmonary:      Effort: Pulmonary effort is normal. No respiratory distress. Breath sounds: Normal breath sounds. No wheezing, rhonchi or rales.    Abdominal:      General: There is no distension. Palpations: Abdomen is soft. Tenderness: There is no abdominal tenderness. Musculoskeletal:         General: Normal range of motion. Cervical back: Normal range of motion. Right lower leg: No edema. Left lower leg: No edema. Skin:     General: Skin is warm and dry. Neurological:      General: No focal deficit present. Mental Status: She is alert. Psychiatric:         Behavior: Behavior normal.       DIFFERENTIAL  DIAGNOSIS   PLAN (LABS / IMAGING / EKG):  Orders Placed This Encounter   Procedures    XR CHEST 1 VIEW    CBC with Auto Differential    Basic Metabolic Panel    Troponin    Troponin    Telemetry monitoring - 72 hour duration    EKG 12 Lead    Insert peripheral IV       MEDICATIONS ORDERED:  Orders Placed This Encounter   Medications    morphine sulfate (PF) injection 4 mg    aspirin chewable tablet 324 mg       DIAGNOSTIC RESULTS / EMERGENCYDEPARTMENT COURSE / MDM   LABS:  Labs Reviewed   BASIC METABOLIC PANEL - Abnormal; Notable for the following components:       Result Value    Glucose 108 (*)     Creatinine 1.00 (*)     All other components within normal limits   CBC WITH AUTO DIFFERENTIAL   TROPONIN   TROPONIN       RADIOLOGY:  No results found.     EKG    EKG Interpretation    Interpreted by emergency department physician    Rhythm: normal sinus   Rate: normal  Axis: normal  Ectopy: premature atrial contraction  Conduction: normal  ST Segments: no acute change  T Waves: inversion in  III  Q Waves: III    Clinical Impression: non-specific EKG    All EKG's are interpreted by the Emergency Department Physician whoeither signs or Co-signs this chart in the absence of a cardiologist.    EMERGENCY DEPARTMENT COURSE:  ED Course as of 11/01/22 0706   Tue Nov 01, 2022   4102 CBC with Auto Differential:    WBC 6.0   RBC 4.29   Hemoglobin Quant 12.5   Hematocrit 39.9   MCV 93.0   MCH 29.1   MCHC 31.3   RDW 14.3   Platelet Count 120   MPV 9.3   NRBC Automated 0.0 Seg Neutrophils 60   Lymphocytes 31   Monocytes 7   Eosinophils % 2   Basophils 0   Immature Granulocytes 0   Segs Absolute 3.53   Absolute Lymph # 1.87   Absolute Mono # 0.40   Absolute Eos # 0.13   Basophils Absolute <0.03   Absolute Immature Granulocyte 0.01 [AO]   4460 Basic Metabolic Panel(!):    Glucose, Random 108(!)   BUN,BUNPL 13   Creatinine 1.00(!)   Est, Glom Filt Rate >60   Bun/Cre Ratio 13   CALCIUM, SERUM, 514999 9.6   Sodium 141   Potassium 3.8   Chloride 105   CO2 25   Anion Gap 11 [AO]   0552 Troponin:    Troponin, High Sensitivity <6 [AO]      ED Course User Index  [AO] Alesia Izquierdo DO       MDM     Amount and/or Complexity of Data Reviewed  Clinical lab tests: ordered and reviewed  Tests in the radiology section of CPT®: ordered and reviewed  Review and summarize past medical records: yes  Independent visualization of images, tracings, or specimens: yes    Patient Progress  Patient progress: stable    History: 1  EC  Patient Age: 1  *Risk factors for Atherosclerotic disease: Hypercholesterolemia; Hypertension; Obesity  Risk Factors: 2  Troponin: 0  Heart Score Total: 4    PROCEDURES:  Procedures     CONSULTS:  None    CRITICAL CARE:  NONE    FINAL IMPRESSION     1. Chest pain, unspecified type        DISPOSITION / PLAN   DISPOSITION Decision To Admit 2022 07:06:25 AM    AWAITING CXR READ, ADMIT TO HOSPITALIST FOR CP WORKUP    Alesia Rodriguez DO  Emergency Medicine Physician    (Please note that portions of this note were completed with a voice recognition program.  Efforts were made to edit the dictations but occasionally words are mis-transcribed.)        Jarret Breen DO  22

## 2022-11-01 NOTE — FLOWSHEET NOTE
Pt tolerated treadmill stress test without discomfort no c/o chest pain no arrhythmias noted resting on cart waiting for further testing call light within sight and reach

## 2022-11-01 NOTE — PROGRESS NOTES
Patient admitted to room 1008. VS taken, telemetry placed and call light given/within reach. Patient A&O and given room orientation. Orders released/reviewed, initial assessment completed and navigator started. See chart for more detail.

## 2022-11-01 NOTE — PROCEDURES
[facility]                 [address] [facility], [facility]                              CARDIAC STRESS TEST    PATIENT NAME: Aneesh Segura                     :        1975  MED REC NO:   [id number]                             ROOM:       11  ACCOUNT NO:   [de-identified]                           ADMIT DATE: 2022  PROVIDER:     Ang Santiago MD    DATE OF STUDY:  2022    EXERCISE EKG STRESS TEST    ATTENDING PROVIDER:  Narayan Ramírez. CHIP Young-CNP    PRIMARY CARE PROVIDER:  Alivia Bailon MD    PERFORMING PROVIDER:  Ang Santiago MD    INDICATION:  Chest pain. The patient exercised on a Garth protocol for a total of 4 minutes and 0  seconds, representing 5.8 METS (metabolic equivalents). Peak heart rate  was 173 bpm which is 101% of the patient's maximum predicted heart rate. The blood pressure was 154/98 mmHg at baseline and increased to 163/84  mmHg at the peak of exercise. In recovery the blood pressure returned to 140/87 mmHg. The test was terminated due to fatigue. During the test the patient reported no chest pain or pressure. Functional capacity is below average. The heart rate response was normal.    Blood pressure response was normal.    The baseline EKG demonstrated sinus rhythm, left ventricular  hypertrophy. The exercise study did not demonstrate ST changes diagnostic of  ischemia. No atrial or ventricular ectopy was noted during the study. EKG response is negative for ischemic changes. IMPRESSION:  EKG portion of pharmacologic stress test is negative for  ischemia. Nuclear portion reported separately.     Beryle Barge, MD    D: 2022 11:08:44       T: 2022 11:11:07     [email or web address]  Job#: [id number]     Doc#: Unknown

## 2022-11-01 NOTE — PROGRESS NOTES
CLINICAL PHARMACY NOTE: MEDS TO BEDS    Total # of Prescriptions Filled: 1   The following medications were delivered to the patient:  Cyclobenzaprine 10mg    Additional Documentation:

## 2022-11-01 NOTE — DISCHARGE SUMMARY
[facility]  Office: [address], DO, Jyothi Garcia, DO, David Preston, DO, [facility] Blood, DO, Ivy Hacnock MD, Diana Faustin MD, Filomena Dela Cruz MD, Jaya Willett MD,  Shaila Neal MD, Faye Hong MD, Jesse Eason, DO, Miriam Deleon MD,  Angie Cordova MD, Jerri Nicole MD, Codey Felton, DO, Rg Nunez MD, Bridger Salas MD, Xavi Junior, DO, Michael Tiwari MD, Donte Angela MD, Saurabh Galindo MD, Annamarie Garcia MD, Colleen Trejo, DO, Radha Pop MD, Mayo Vela MD, Sheridan Harrison, [facility],  Bari Wright, CNP, Vicenta Alvarez, CNP, Kristopher Lopez, CNP,  Burnett Osgood, [facility], Bernadette Stein, CNP, Mya Safe, CNP, Ashlyn Norton, CNP, Felisha Fraser, CNP, Florence Williamson, [facility], Annie Young PA-C, Leonor Smith, CNS, Fariba Stevens, [facility], Jaclyn Bhat, CNP, Ruiz Sauceda, CNP, Joey Paredes, [facility]    Discharge Summary     Patient ID: Mariangel Nunez  :  1975   MRN: [id number]     ACCOUNT:  [de-identified]   Patient's PCP: Yanci Hong MD  Admit Date: 2022   Discharge Date: 2022     Length of Stay: 0  Code Status:  Full Code  Admitting Physician: Filomena Dela Cruz MD  Discharge Physician: CHIP Bruno NP     Active Discharge Diagnoses:     Hospital Problem Lists:  Principal Problem:    Chest pain  Active Problems:    Hyperlipidemia    RA (rheumatoid arthritis) ([facility] [address]    Asthma    Essential hypertension    Pure hypercholesterolemia  Resolved Problems:    * No resolved hospital problems.  *      Admission Condition:  fair     Discharged Condition: fair    Hospital Stay:     Hospital Course:  Mariangel Nunez is a 50 y.o. female who was admitted for the management of  Chest pain , presented to ER with Chest Pain (Started this am when pt got up for work) and Shortness of Breath      Patient reports that this morning while she was getting dressed for work she suddenly had onset of right-sided chest pain described as a tearing pressure directly under her right breast.  Patient has no known history of CAD however she reports she has a strong family history and does have a personal history of hyperlipidemia and hypertension. Patient was recently started on methotrexate at an increased dose for a new diagnosis of rheumatoid arthritis. Patient is also been under the care of nephrology for worsening CKD. Stress test unremarkable. Troponin is normal.  EKG normal.  Chest pain is not likely secondary to ACS. Due to the patient's recent increase in her methotrexate dosage we recommend she follow with her rheumatologist within 1 week to discuss possible pneumonitis. She is also advised to take Tylenol for her neck pain as well as Flexeril. She should also try noninvasive treatments such as heat/ice and stretching with massage. Significant therapeutic interventions: As above    Significant Diagnostic Studies:   Labs / Micro:  CBC:   Lab Results   Component Value Date/Time    WBC 6.0 11/01/2022 05:13 AM    RBC 4.29 11/01/2022 05:13 AM    HGB 12.5 11/01/2022 05:13 AM    HCT 39.9 11/01/2022 05:13 AM    MCV 93.0 11/01/2022 05:13 AM    MCH 29.1 11/01/2022 05:13 AM    MCHC 31.3 11/01/2022 05:13 AM    RDW 14.3 11/01/2022 05:13 AM     11/01/2022 05:13 AM     BMP:    Lab Results   Component Value Date/Time    GLUCOSE 108 11/01/2022 05:13 AM     11/01/2022 05:13 AM    K 3.8 11/01/2022 05:13 AM     11/01/2022 05:13 AM    CO2 25 11/01/2022 05:13 AM    ANIONGAP 11 11/01/2022 05:13 AM    BUN 13 11/01/2022 05:13 AM    CREATININE 1.00 11/01/2022 05:13 AM    BUNCRER 13 11/01/2022 05:13 AM    CALCIUM 9.6 11/01/2022 05:13 AM    LABGLOM >60 11/01/2022 05:13 AM    GFRAA >60 06/16/2021 07:43 AM    GFR      06/16/2021 07:43 AM    GFR NOT REPORTED 06/16/2021 07:43 AM        Radiology:  XR CHEST 1 VIEW    Result Date: 11/1/2022  No evidence of acute process.      NM Cardiac Stress Test Nuclear Imaging    Result Date: 11/1/2022  1. No definitive scintigraphic evidence for reversible ischemia or infarct. 2. Left ventricular ejection fraction of 70%. 3.  Please see report for EKG portion of the examination which will be performed separately by physician from cardiology. Risk stratification:  Low risk Note:  Risk stratification incorporates both clinical history and test results. Final risk determination is the responsibility of the ordering provider as history and other test results may increase or decrease the risk stratification reported for this examination. Risk stratification criteria are adapted from \"Noninvasive Risk Stratification\" criteria from [employer] Al, ACC/AATS/AHA/ASE/ASNC/SCAI/SCCT/STS 2017 Appropriate Use Criteria For Coronary Revascularization in Patients With Stable Ischemic Heart Disease [facility] Volume 69, Issue 17, May 2017 High risk (>3% annual death or MI) 1. Severe resting LV dysfunction (LVEF >35%) not readily explained by non coronary causes 2. Resting perfusion abnormalities greater than 10% of the myocardium in patients without prior history or evidence of MI 3. Stress-induced perfusion abnormalities encumbering greater than or equal to 10% myocardium or stress segmental scores indicating multiple vascular territories with abnormalities 4. Stress-induced LV dilatation (TID ratio greater than 1.19 for exercise and greater than 1.39 for regadenoson) Intermediate risk (1% to 3% annual death or MI) 1. Mild/moderate resting LV dysfunction (LVEF 35% to 49%) not readily explained by non coronary causes. 2. Resting perfusion abnormalities in 5%-9.9% of the myocardium in patients without a history or prior evidence of MI 3. Stress-induced perfusion abnormality encumbering 5%-9.9% of the myocardium or stress segmental scores indicating 1 vascular territory with abnormalities but without LV dilation 4.  Small wall motion abnormality involving 1-2 segments and only 1 coronary bed. Low Risk (Less than 1% annual death or MI) 1. Normal or small myocardial perfusion defect at rest or with stress encumbering less than 5% of the myocardium. This examination was read in conjunction with the cardiology fellow, Dr. Serjio Bess. Consultations:    Consults:     Final Specialist Recommendations/Findings:   IP CONSULT TO HOSPITALIST      The patient was seen and examined on day of discharge and this discharge summary is in conjunction with any daily progress note from day of discharge. Discharge plan:     Disposition: Home    Physician Follow Up:     Please make and keep a follow-up appointment with your rheumatologist for the following week. Requiring Further Evaluation/Follow Up POST HOSPITALIZATION/Incidental Findings: Chest pain after recent increase in methotrexate. ? Pneumonitis    Diet: regular diet    Activity: As tolerated    Instructions to Patient: Please make and keep a follow-up appointment with your rheumatologist for this week if possible. I would like you to discuss with them possible side effects of increasing your methotrexate dosage. Take the Flexeril and Tylenol for your neck pain. Avoid NSAIDs such as ibuprofen or naproxen due to your renal function. Please follow-up with your kidney specialist to get your results. Discharge Medications:      Medication List        START taking these medications      folic acid 1 MG tablet  Commonly known as: FOLVITE  Take 1 tablet by mouth daily     hydroCHLOROthiazide 25 MG tablet  Commonly known as: HYDRODIURIL  Take 1 tablet by mouth every morning     pantoprazole 40 MG tablet  Commonly known as: PROTONIX  Take 1 tablet by mouth every morning (before breakfast)            CHANGE how you take these medications      cyclobenzaprine 10 MG tablet  Commonly known as: FLEXERIL  Take 1 tablet by mouth 3 times daily as needed for Muscle spasms  What changed:   medication strength  See the new instructions. CONTINUE taking these medications      * albuterol (5 MG/ML) 0.5% nebulizer solution  Commonly known as: PROVENTIL  Take 1 mL by nebulization 4 times daily as needed for Wheezing     * albuterol sulfate  (90 Base) MCG/ACT inhaler  Commonly known as: ProAir HFA  Inhale 2 puffs into the lungs every 6 hours as needed for Wheezing     * albuterol (2.5 MG/3ML) 0.083% nebulizer solution  Commonly known as: PROVENTIL  Take 3 mLs by nebulization every 6 hours as needed for Wheezing     metoprolol succinate 25 MG extended release tablet  Commonly known as: TOPROL XL  TAKE 1 TABLET BY MOUTH EVERY DAY     Spacer/Aero-Holding Chambers Ioana  1 Device by Does not apply route daily as needed (wheezing)     Vitamin D3 50 MCG (2000 UT) Caps           * This list has 3 medication(s) that are the same as other medications prescribed for you. Read the directions carefully, and ask your doctor or other care provider to review them with you.                 STOP taking these medications      ibuprofen 800 MG tablet  Commonly known as: ADVIL;MOTRIN     meclizine 12.5 MG tablet  Commonly known as: ANTIVERT     potassium chloride 20 MEQ extended release tablet  Commonly known as: KLOR-CON M     Prempro 0.3-1.5 MG per tablet  Generic drug: estrogen (conjugated)-medroxyPROGESTERone     SUMAtriptan 50 MG tablet  Commonly known as: IMITREX            ASK your doctor about these medications      budesonide-formoterol 160-4.5 MCG/ACT Aero  Commonly known as: Symbicort  Inhale 2 puffs into the lungs 2 times daily               Where to Get Your Medications        These medications were sent to [facility] [address], [facility] [address] [address]      Phone: [phone]   cyclobenzaprine 10 MG tablet       You can get these medications from any pharmacy    Bring a paper prescription for each of these medications  folic acid 1 MG tablet  hydroCHLOROthiazide 25 MG tablet  pantoprazole 40 MG tablet         No discharge procedures on file. Time Spent on discharge is  38 mins in patient examination, evaluation, counseling as well as medication reconciliation, prescriptions for required medications, discharge plan and follow up. Electronically signed by   CHIP Beck NP  11/1/2022  2:54 PM      Thank you Dr. Carli Jean MD for the opportunity to be involved in this patient's care.

## 2022-11-01 NOTE — CARE COORDINATION
11/01/22 1118   Service Assessment   Patient Orientation Alert and Oriented   Cognition Alert   History Provided By Patient   Primary Caregiver Self   Support Systems Spouse/Significant Other   PCP Verified by CM Yes   Last Visit to PCP Within last year   Prior Functional Level Independent in ADLs/IADLs   Current Functional Level Independent in ADLs/IADLs   Can patient return to prior living arrangement Yes   Ability to make needs known: Good   Family able to assist with home care needs: Yes   Would you like for me to discuss the discharge plan with any other family members/significant others, and if so, who? No   Social/Functional History   Lives With Significant other   Type of [address] Two level   Home Access Stairs to enter without rails   Entrance Stairs - Number of Steps 3   ADL Assistance Independent   Homemaking Assistance Independent   Ambulation Assistance Independent   Transfer Assistance Independent   Active  Yes   Mode of Transportation Car   Occupation Full time employment   Discharge Planning   Type of [address] Prior To Admission None   Potential Assistance Needed N/A   DME Ordered? No   Potential Assistance Purchasing Medications No   Type of Home Care Services None   Patient expects to be discharged to: House   One/Two Story Residence Two story   # of Interior Steps 13   Lift Chair Available No   History of falls? 0   Services At/After Discharge   Services At/After Discharge None   [employer] Information Provided? No   Mode of Transport at Discharge Other (see comment)  (significant other Juancarlos)   Patient lives with significant other. Able to ambulate independently. Requires no assistance with ADLs. [facility] on [address] Has Interventional Imaging. Reports no needs prior to discharge.

## 2022-11-01 NOTE — DISCHARGE INSTRUCTIONS
Please make and keep a follow-up appointment with your rheumatologist for this week if possible. I would like you to discuss with them possible side effects of increasing your methotrexate dosage. Take the Flexeril and Tylenol for your neck pain. Avoid NSAIDs such as ibuprofen or naproxen due to your renal function. Please follow-up with your kidney specialist to get your results.

## 2022-11-01 NOTE — ED NOTES
Pt resting on ED stretcher, awake. Requested lights off- this RN turned them off.      Jones Truong RN  11/01/22 0963

## 2022-11-01 NOTE — DISCHARGE INSTR - DIET

## 2022-11-01 NOTE — ED NOTES
ED to inpatient nurses report     Chief Complaint   Patient presents with    Chest Pain     Started this am when pt got up for work    Shortness of Breath      Present to ED from home  LOC: alert and orientated to name, place, date  Vital signs   Vitals:    11/01/22 0615 11/01/22 0626 11/01/22 0641 11/01/22 0656   BP: 138/84 (!) 140/93 125/88 (!) 143/99   Pulse: 88 80 83 82   Resp: 18 14 15 19   Temp:       TempSrc:       SpO2:  98% 99% 98%   Weight:          Oxygen Baseline room air    Current needs required room air   SEPSIS: n/a  [n/a] Lactate X 2 ordered (Yes or No)  [n/a] Antibiotics given (Yes or No)  [n/q] IV Fluids ordered (Yes or No)             [n/a] IV completed (Yes or No)  [n/a] Hourly Vital Signs (Validated)  [n/a] Outstanding Orders:     LDAs:   Peripheral IV 11/01/22 Right Antecubital (Active)   Site Assessment Clean, dry & intact 11/01/22 0513   Line Status Blood return noted;Normal saline locked 11/01/22 0513   Phlebitis Assessment No symptoms 11/01/22 0513   Infiltration Assessment 0 11/01/22 0513   Dressing Status New dressing applied;Clean, dry & intact 11/01/22 0513   Dressing Type Transparent 11/01/22 0513     Mobility: Independent  Fall Risk: [place] 1 Fall Risk  Presents to emergency department  because of falls (Syncope, seizure, or loss of consciousness): No (11/01/22 0500)  Age > 79: No (11/01/22 0500)  Altered Mental Status, Intoxication with alcohol or substance confusion (Disorientation, impaired judgment, poor safety awaremess, or inability to follow instructions): No (11/01/22 0500)  Impaired Mobility: Ambulates or transfers with assistive devices or assistance;  Unable to ambulate or transer.: No (11/01/22 0500)  Nursing Judgement: No (11/01/22 0500)  Pending ED orders: none  Present condition: stable  Code Status: full code  Consults: IP CONSULT TO HOSPITALIST  []  Hospitalist  Completed  [] yes [] no Who:   []  Medicine  Completed  [] yes [] No Who:   []  Cardiology  Completed  [] yes [] No Who:   []  GI   Completed  [] yes [] No Who:   []  Neurology  Completed  [] yes [] No Who:   []  Nephrology Completed  [] yes [] No Who:    []  Vascular  Completed  [] yes [] No Who:   []  Ortho  Completed  [] yes [] No Who:     []  Surgery  Completed  [] yes [] No Who:    []  Urology  Completed  [] yes [] No Who:    []  CT Surgery Completed  [] yes [] No Who:   []  Podiatry  Completed  [] yes [] No Who:    []  Other    Completed  [] yes [] No Who:  Interventions: IV, labs, ekg, xray  Important Events: none        Electronically signed by Mamadou Santiago RN on 11/1/2022 at 8:07 AM       Mamadou Santiago RN  11/01/22 0158

## 2022-11-01 NOTE — RT PROTOCOL NOTE
RT Inhaler-Nebulizer Bronchodilator Protocol Note    There is a bronchodilator order in the chart from a provider indicating to follow the RT Bronchodilator Protocol and there is an Initiate RT Inhaler-Nebulizer Bronchodilator Protocol order as well (see protocol at bottom of note). CXR Findings:  No results found. The findings from the last RT Protocol Assessment were as follows:   History Pulmonary Disease: None or smoker <15 pack years  Respiratory Pattern: Regular pattern and RR 12-20 bpm  Breath Sounds: Clear breath sounds  Cough: Strong, spontaneous, non-productive  Indication for Bronchodilator Therapy:    Bronchodilator Assessment Score: 0    Aerosolized bronchodilator medication orders have been revised according to the RT Inhaler-Nebulizer Bronchodilator Protocol below. Respiratory Therapist to perform RT Therapy Protocol Assessment initially then follow the protocol. Repeat RT Therapy Protocol Assessment PRN for score 0-3 or on second treatment, BID, and PRN for scores above 3. No Indications - adjust the frequency to every 6 hours PRN wheezing or bronchospasm, if no treatments needed after 48 hours then discontinue using Per Protocol order mode. If indication present, adjust the RT bronchodilator orders based on the Bronchodilator Assessment Score as indicated below. Use Inhaler orders unless patient has one or more of the following: on home nebulizer, not able to hold breath for 10 seconds, is not alert and oriented, cannot activate and use MDI correctly, or respiratory rate 25 breaths per minute or more, then use the equivalent nebulizer order(s) with same Frequency and PRN reasons based on the score. If a patient is on this medication at home then do not decrease Frequency below that used at home.     0-3 - enter or revise RT bronchodilator order(s) to equivalent RT Bronchodilator order with Frequency of every 4 hours PRN for wheezing or increased work of breathing using Per Protocol order mode. 4-6 - enter or revise RT Bronchodilator order(s) to two equivalent RT bronchodilator orders with one order with BID Frequency and one order with Frequency of every 4 hours PRN wheezing or increased work of breathing using Per Protocol order mode. 7-10 - enter or revise RT Bronchodilator order(s) to two equivalent RT bronchodilator orders with one order with TID Frequency and one order with Frequency of every 4 hours PRN wheezing or increased work of breathing using Per Protocol order mode. 11-13 - enter or revise RT Bronchodilator order(s) to one equivalent RT bronchodilator order with QID Frequency and an Albuterol order with Frequency of every 4 hours PRN wheezing or increased work of breathing using Per Protocol order mode. Greater than 13 - enter or revise RT Bronchodilator order(s) to one equivalent RT bronchodilator order with every 4 hours Frequency and an Albuterol order with Frequency of every 2 hours PRN wheezing or increased work of breathing using Per Protocol order mode. RT to enter RT Home Evaluation for COPD & MDI Assessment order using Per Protocol order mode.     Electronically signed by Clary Strickland RCP on 11/1/2022 at 12:48 PM

## 2022-11-04 LAB
EKG ATRIAL RATE: 95 BPM
EKG P AXIS: 65 DEGREES
EKG P-R INTERVAL: 180 MS
EKG Q-T INTERVAL: 344 MS
EKG QRS DURATION: 74 MS
EKG QTC CALCULATION (BAZETT): 432 MS
EKG R AXIS: 41 DEGREES
EKG T AXIS: 21 DEGREES
EKG VENTRICULAR RATE: 95 BPM

## 2022-11-04 PROCEDURE — 93010 ELECTROCARDIOGRAM REPORT: CPT | Performed by: INTERNAL MEDICINE

## 2023-08-07 LAB
CHOLESTEROL/HDL RATIO: 3.4
CHOLESTEROL: 212 MG/DL
HDLC SERPL-MCNC: 62 MG/DL
LDL CHOLESTEROL CALCULATED: 133 MG/DL
TRIGL SERPL-MCNC: 83 MG/DL
VLDLC SERPL CALC-MCNC: 17 MG/DL

## 2024-05-17 ENCOUNTER — HOSPITAL ENCOUNTER (OUTPATIENT)
Dept: PREADMISSION TESTING | Age: 49
End: 2024-05-17
Payer: COMMERCIAL

## 2024-05-17 VITALS
TEMPERATURE: 97.6 F | RESPIRATION RATE: 18 BRPM | HEART RATE: 79 BPM | BODY MASS INDEX: 37.65 KG/M2 | SYSTOLIC BLOOD PRESSURE: 140 MMHG | OXYGEN SATURATION: 100 % | HEIGHT: 65 IN | WEIGHT: 226 LBS | DIASTOLIC BLOOD PRESSURE: 89 MMHG

## 2024-05-17 LAB
ANION GAP SERPL CALCULATED.3IONS-SCNC: 8 MMOL/L (ref 9–17)
BUN SERPL-MCNC: 14 MG/DL (ref 6–20)
BUN/CREAT SERPL: 14 (ref 9–20)
CALCIUM SERPL-MCNC: 9.1 MG/DL (ref 8.6–10.4)
CHLORIDE SERPL-SCNC: 107 MMOL/L (ref 98–107)
CO2 SERPL-SCNC: 28 MMOL/L (ref 20–31)
CREAT SERPL-MCNC: 1 MG/DL (ref 0.5–0.9)
EKG ATRIAL RATE: 73 BPM
EKG P AXIS: 68 DEGREES
EKG P-R INTERVAL: 182 MS
EKG Q-T INTERVAL: 388 MS
EKG QRS DURATION: 88 MS
EKG QTC CALCULATION (BAZETT): 427 MS
EKG R AXIS: 46 DEGREES
EKG T AXIS: 23 DEGREES
EKG VENTRICULAR RATE: 73 BPM
ERYTHROCYTE [DISTWIDTH] IN BLOOD BY AUTOMATED COUNT: 13.6 % (ref 11.8–14.4)
GFR, ESTIMATED: 68 ML/MIN/1.73M2
GLUCOSE SERPL-MCNC: 88 MG/DL (ref 70–99)
HCT VFR BLD AUTO: 39.2 % (ref 36.3–47.1)
HGB BLD-MCNC: 12.2 G/DL (ref 11.9–15.1)
MCH RBC QN AUTO: 29.8 PG (ref 25.2–33.5)
MCHC RBC AUTO-ENTMCNC: 31.1 G/DL (ref 28.4–34.8)
MCV RBC AUTO: 95.8 FL (ref 82.6–102.9)
NRBC BLD-RTO: 0 PER 100 WBC
PLATELET # BLD AUTO: 253 K/UL (ref 138–453)
PMV BLD AUTO: 10.2 FL (ref 8.1–13.5)
POTASSIUM SERPL-SCNC: 4 MMOL/L (ref 3.7–5.3)
RBC # BLD AUTO: 4.09 M/UL (ref 3.95–5.11)
SODIUM SERPL-SCNC: 143 MMOL/L (ref 135–144)
WBC OTHER # BLD: 4.5 K/UL (ref 3.5–11.3)

## 2024-05-17 PROCEDURE — 85027 COMPLETE CBC AUTOMATED: CPT

## 2024-05-17 PROCEDURE — 80048 BASIC METABOLIC PNL TOTAL CA: CPT

## 2024-05-17 PROCEDURE — 36415 COLL VENOUS BLD VENIPUNCTURE: CPT

## 2024-05-17 PROCEDURE — 93005 ELECTROCARDIOGRAM TRACING: CPT | Performed by: ANESTHESIOLOGY

## 2024-05-17 RX ORDER — LEUCOVORIN CALCIUM 5 MG/1
15 TABLET ORAL AS NEEDED
COMMUNITY

## 2024-05-17 RX ORDER — CHLORAL HYDRATE 500 MG
1 CAPSULE ORAL DAILY
COMMUNITY

## 2024-05-17 RX ORDER — KETOCONAZOLE 20 MG/ML
SHAMPOO TOPICAL
COMMUNITY
Start: 2023-05-31

## 2024-05-17 RX ORDER — PREDNISONE 50 MG/1
50 TABLET ORAL DAILY PRN
COMMUNITY
Start: 2024-03-13

## 2024-05-17 RX ORDER — METHOTREXATE 2.5 MG/1
2.5 TABLET ORAL WEEKLY
COMMUNITY

## 2024-05-17 RX ORDER — CLOBETASOL PROPIONATE 0.46 MG/ML
SOLUTION TOPICAL
COMMUNITY
Start: 2023-05-31

## 2024-05-17 RX ORDER — METOPROLOL TARTRATE 75 MG/1
75 TABLET, FILM COATED ORAL DAILY
COMMUNITY
Start: 2024-03-20

## 2024-05-17 RX ORDER — HYDROXYCHLOROQUINE SULFATE 200 MG/1
200 TABLET, FILM COATED ORAL 2 TIMES DAILY
COMMUNITY

## 2024-05-17 RX ORDER — ROSUVASTATIN CALCIUM 10 MG/1
10 TABLET, COATED ORAL NIGHTLY
COMMUNITY

## 2024-05-17 ASSESSMENT — PAIN DESCRIPTION - ORIENTATION: ORIENTATION: RIGHT;LEFT

## 2024-05-17 ASSESSMENT — PAIN DESCRIPTION - DESCRIPTORS: DESCRIPTORS: ACHING;THROBBING;PRESSURE;SHARP

## 2024-05-17 ASSESSMENT — PAIN DESCRIPTION - FREQUENCY: FREQUENCY: CONTINUOUS

## 2024-05-17 ASSESSMENT — PAIN DESCRIPTION - PAIN TYPE: TYPE: CHRONIC PAIN

## 2024-05-17 ASSESSMENT — PAIN SCALES - GENERAL: PAINLEVEL_OUTOF10: 8

## 2024-05-17 ASSESSMENT — PAIN DESCRIPTION - LOCATION: LOCATION: FOOT

## 2024-05-17 NOTE — PRE-PROCEDURE INSTRUCTIONS
On the Day of Your Surgery, Thursday, 5/30/24, Please Arrive At 1:30 PM     Enter the hospital through the Main Entrance, take the lobby elevators to the second floor and check in at the Surgery Registration desk.     Continue to take your home medications as you normally do up to and including the night before surgery with the exception of blood thinning medications.    Blood Thinning Medications:  Please stop prescription blood thinning medications such as Apixaban (Eliquis); Clopidogrel (Plavix); Dabigatran (Pradaxa); Prasugrel (Effient); Rivaroxaban (Xarelto); Ticagrelor (Brilinta); Warfarin (Coumadin) only as directed by your surgeon and/or the prescribing physician    Some common examples of other medications that can thin your blood are: Aspirin, Ibuprofen (Advil, Motrin), Naproxen (Aleve), Meloxicam (Mobic), Celecoxib (Celebrex), Fish Oil, many Herbal Supplements.  These medications should usually be stopped at least 7 days prior to surgery.    Fish oil and vitamin A    Tylenol is OK to take for pain the week prior to surgery.    Failure to stop certain medications may interfere with your scheduled surgery.    If you receive instructions from your surgeon regarding what medications to stop prior to surgery, please follow those specific instructions.    If You Have Diabetes:  Do not take any of your diabetic medications, (injectables or by mouth) the morning of surgery unless otherwise instructed by the doctor who manages your diabetes. If you are taking insulin, contact the doctor the manages your diabetes for instructions about any changes to your insulin dosages the day before surgery.      Please take the following medication(s) the day of surgery with small sips of water:              Metoprolol and bring rescue inhaler Day of surgery. You may use your nebulizer morning of surgery if needed    Please use your inhaler(s) if needed and bring your inhaler(s) from home the day of surgery.    Showering Before

## 2024-05-17 NOTE — H&P
use.    Family History:     Family History   Problem Relation Age of Onset    Heart Failure Mother     Other Father         MI    Hypertension Brother        Review of Systems:     Positive and Negative as described in HPI.    CONSTITUTIONAL: Negative for fevers, chills, sweats, fatigue, and weight loss.  HEENT: Glasses. Negative for hearing changes, rhinorrhea, and throat pain.  RESPIRATORY: Asthma- well controlled, managed by primary care. Uses rescue inhaler during allergy seasons once/weekly. SOB with exertion Negative for cough, congestion, and wheezing.  CARDIOVASCULAR: HTN. HLD. Occasional chest pain- less frequent since starting Lopressor. Patient states she would get chest pain in past with HTN episodes. Follows with [facility] cardiology. Currently gets chest pain monthly \"lasts for a minute or two\". Cardiac testing attached above(ECHO, stress). Negative for blood clot, irregular heartbeat, and palpitations.  GASTROINTESTINAL: Negative for reflux, nausea, vomiting, diarrhea, constipation, change in bowel habits, and abdominal pain.   GENITOURINARY: Chronic kidney disease- follows with nephrology at [facility] every 3 months Dr. Owens Negative for difficulty of urination, burning with urination, and frequency.   INTEGUMENT: Negative for rash, skin lesions, and easy bruising.   HEMATOLOGIC/LYMPHATIC: BLE swelling   ALLERGIC/IMMUNOLOGIC: Alopecia. Rheumatoid arthritis- follows with rheumatology Dr. Rush Negative for urticaria and itching.  ENDOCRINE: Negative for increase in thirst, increase in urination, and heat or cold intolerance.  MUSCULOSKELETAL: See HPI   NEUROLOGICAL: Numbness/tingling BLE. Occasional headaches with slight dizziness. Poor balance. Negative for lightheadedness.  BEHAVIOR/PSYCH: Negative for depression and anxiety.    Physical Exam:   BP (!) 140/89   Pulse 79   Temp 97.6 °F (36.4 °C) (Temporal)   Resp 18   Ht 1.651 m (5' 5\")   Wt 102.5 kg (226 lb)   SpO2 100%   BMI  37.61 kg/m²   No LMP recorded. Patient has had an ablation.    No results for input(s): \"POCGLU\" in the last 72 hours.    General Appearance:  Alert, well appearing, and in no acute distress.  Mental status: Oriented to person, place, and time.  Head: Normocephalic and atraumatic.  Eye: Glasses. No icterus, redness, pupils equal and reactive, extraocular eye movements intact, and conjunctiva clear.  Ear: Hearing grossly intact.  Nose: No drainage noted.  Mouth: Mucous membranes moist.  Neck: Supple and no carotid bruits noted.  Lungs: Bilateral equal air entry, clear to auscultation, no wheezing, rales or rhonchi, and normal effort.  Cardiovascular: Normal rate, regular rhythm, no murmur, gallop, or rub.  Abdomen: Obese.  Soft, nontender, nondistended, and active bowel sounds.  Neurologic: Normal speech and cranial nerves II through XII grossly intact. Strength 5/5 bilaterally.  Skin: No gross lesions, rashes, bruising, or bleeding on exposed skin area.  Extremities: Bilateral +1 pedal edema. Tenderness bilateral great toes. Posterior tibial pulses 2+ bilaterally. No calf tenderness with palpation.  Psych:  Normal affect.     Investigations:      Laboratory Testing:  Recent Results (from the past 24 hour(s))   EKG 12 Lead    Collection Time: 24  9:32 AM   Result Value Ref Range    Ventricular Rate 73 BPM    Atrial Rate 73 BPM    P-R Interval 182 ms    QRS Duration 88 ms    Q-T Interval 388 ms    QTc Calculation (Bazett) 427 ms    P Axis 68 degrees    R Axis 46 degrees    T Axis 23 degrees       No results for input(s): \"HGB\", \"HCT\", \"WBC\", \"MCV\", \"PLATELET\", \"NA\", \"K\", \"CL\", \"CO2\", \"BUN\", \"CREATININE\", \"GLUCOSE\", \"INR\", \"PROTIME\", \"APTT\", \"AST\", \"ALT\", \"LABALBU\", \"HCG\" in the last 720 hours.    No results for input(s): \"COVID19\" in the last 720 hours.    *Please note that labs listed above are the most recent lab values available in [facility] at the time of the visit and additional labs may have been drawn

## 2024-05-20 NOTE — PERIOP NOTE
Dr. Negro reviewed medical history, functional capacity, stress test, and last cardiology note from 10/6/23. No further intervention needed at this time.

## 2024-05-30 ENCOUNTER — ANESTHESIA EVENT (OUTPATIENT)
Dept: OPERATING ROOM | Age: 49
End: 2024-05-30
Payer: COMMERCIAL

## 2024-05-30 ENCOUNTER — ANESTHESIA (OUTPATIENT)
Dept: OPERATING ROOM | Age: 49
End: 2024-05-30
Payer: COMMERCIAL

## 2024-05-30 ENCOUNTER — HOSPITAL ENCOUNTER (OUTPATIENT)
Age: 49
Setting detail: OUTPATIENT SURGERY
Discharge: HOME | End: 2024-05-30
Attending: PODIATRIST | Admitting: PODIATRIST
Payer: COMMERCIAL

## 2024-05-30 VITALS
HEIGHT: 65 IN | DIASTOLIC BLOOD PRESSURE: 103 MMHG | BODY MASS INDEX: 37.65 KG/M2 | WEIGHT: 226 LBS | TEMPERATURE: 97.2 F | HEART RATE: 65 BPM | SYSTOLIC BLOOD PRESSURE: 172 MMHG | RESPIRATION RATE: 14 BRPM | OXYGEN SATURATION: 100 %

## 2024-05-30 PROCEDURE — 2500000003 HC RX 250 WO HCPCS: Performed by: PODIATRIST

## 2024-05-30 PROCEDURE — 2709999900 HC NON-CHARGEABLE SUPPLY: Performed by: PODIATRIST

## 2024-05-30 PROCEDURE — 6360000002 HC RX W HCPCS: Performed by: NURSE ANESTHETIST, CERTIFIED REGISTERED

## 2024-05-30 PROCEDURE — 6360000002 HC RX W HCPCS: Performed by: PODIATRIST

## 2024-05-30 PROCEDURE — 3700000000 HC ANESTHESIA ATTENDED CARE: Performed by: PODIATRIST

## 2024-05-30 PROCEDURE — 2580000003 HC RX 258: Performed by: ANESTHESIOLOGY

## 2024-05-30 PROCEDURE — 3700000001 HC ADD 15 MINUTES (ANESTHESIA): Performed by: PODIATRIST

## 2024-05-30 PROCEDURE — C1762 CONN TISS, HUMAN(INC FASCIA): HCPCS | Performed by: PODIATRIST

## 2024-05-30 PROCEDURE — 3600000002 HC SURGERY LEVEL 2 BASE: Performed by: PODIATRIST

## 2024-05-30 PROCEDURE — 6370000000 HC RX 637 (ALT 250 FOR IP): Performed by: ANESTHESIOLOGY

## 2024-05-30 PROCEDURE — 3600000012 HC SURGERY LEVEL 2 ADDTL 15MIN: Performed by: PODIATRIST

## 2024-05-30 PROCEDURE — 2500000003 HC RX 250 WO HCPCS: Performed by: NURSE ANESTHETIST, CERTIFIED REGISTERED

## 2024-05-30 PROCEDURE — 7100000011 HC PHASE II RECOVERY - ADDTL 15 MIN: Performed by: PODIATRIST

## 2024-05-30 PROCEDURE — 7100000010 HC PHASE II RECOVERY - FIRST 15 MIN: Performed by: PODIATRIST

## 2024-05-30 DEVICE — ALLOGRAFT ADIPOSE EXTRACELLULAR MTRX 3 CC LENEVA: Type: IMPLANTABLE DEVICE | Site: FOOT | Status: FUNCTIONAL

## 2024-05-30 RX ORDER — LIDOCAINE HYDROCHLORIDE 20 MG/ML
INJECTION, SOLUTION EPIDURAL; INFILTRATION; INTRACAUDAL; PERINEURAL PRN
Status: DISCONTINUED | OUTPATIENT
Start: 2024-05-30 | End: 2024-05-30 | Stop reason: SDUPTHER

## 2024-05-30 RX ORDER — FENTANYL CITRATE 50 UG/ML
25 INJECTION, SOLUTION INTRAMUSCULAR; INTRAVENOUS EVERY 5 MIN PRN
Status: DISCONTINUED | OUTPATIENT
Start: 2024-05-30 | End: 2024-05-30 | Stop reason: HOSPADM

## 2024-05-30 RX ORDER — SODIUM CHLORIDE, SODIUM LACTATE, POTASSIUM CHLORIDE, CALCIUM CHLORIDE 600; 310; 30; 20 MG/100ML; MG/100ML; MG/100ML; MG/100ML
INJECTION, SOLUTION INTRAVENOUS CONTINUOUS
Status: DISCONTINUED | OUTPATIENT
Start: 2024-05-30 | End: 2024-05-30 | Stop reason: HOSPADM

## 2024-05-30 RX ORDER — SODIUM CHLORIDE 0.9 % (FLUSH) 0.9 %
5-40 SYRINGE (ML) INJECTION EVERY 12 HOURS SCHEDULED
Status: DISCONTINUED | OUTPATIENT
Start: 2024-05-30 | End: 2024-05-30 | Stop reason: HOSPADM

## 2024-05-30 RX ORDER — PROCHLORPERAZINE EDISYLATE 5 MG/ML
5 INJECTION INTRAMUSCULAR; INTRAVENOUS
Status: DISCONTINUED | OUTPATIENT
Start: 2024-05-30 | End: 2024-05-30 | Stop reason: HOSPADM

## 2024-05-30 RX ORDER — SODIUM CHLORIDE 9 MG/ML
INJECTION, SOLUTION INTRAVENOUS PRN
Status: DISCONTINUED | OUTPATIENT
Start: 2024-05-30 | End: 2024-05-30 | Stop reason: HOSPADM

## 2024-05-30 RX ORDER — SODIUM CHLORIDE 9 MG/ML
INJECTION, SOLUTION INTRAVENOUS CONTINUOUS
Status: DISCONTINUED | OUTPATIENT
Start: 2024-05-30 | End: 2024-05-30 | Stop reason: HOSPADM

## 2024-05-30 RX ORDER — LIDOCAINE HYDROCHLORIDE 10 MG/ML
1 INJECTION, SOLUTION EPIDURAL; INFILTRATION; INTRACAUDAL; PERINEURAL
Status: DISCONTINUED | OUTPATIENT
Start: 2024-05-30 | End: 2024-05-30 | Stop reason: HOSPADM

## 2024-05-30 RX ORDER — PROPOFOL 10 MG/ML
INJECTION, EMULSION INTRAVENOUS PRN
Status: DISCONTINUED | OUTPATIENT
Start: 2024-05-30 | End: 2024-05-30 | Stop reason: SDUPTHER

## 2024-05-30 RX ORDER — OXYCODONE HYDROCHLORIDE 5 MG/1
5 TABLET ORAL
Status: DISCONTINUED | OUTPATIENT
Start: 2024-05-30 | End: 2024-05-30 | Stop reason: HOSPADM

## 2024-05-30 RX ORDER — ACETAMINOPHEN 325 MG/1
TABLET ORAL
Status: DISCONTINUED
Start: 2024-05-30 | End: 2024-05-30 | Stop reason: HOSPADM

## 2024-05-30 RX ORDER — MIDAZOLAM HYDROCHLORIDE 1 MG/ML
INJECTION INTRAMUSCULAR; INTRAVENOUS PRN
Status: DISCONTINUED | OUTPATIENT
Start: 2024-05-30 | End: 2024-05-30 | Stop reason: SDUPTHER

## 2024-05-30 RX ORDER — SODIUM CHLORIDE 0.9 % (FLUSH) 0.9 %
5-40 SYRINGE (ML) INJECTION PRN
Status: DISCONTINUED | OUTPATIENT
Start: 2024-05-30 | End: 2024-05-30 | Stop reason: HOSPADM

## 2024-05-30 RX ORDER — ACETAMINOPHEN 325 MG/1
650 TABLET ORAL ONCE
Status: COMPLETED | OUTPATIENT
Start: 2024-05-30 | End: 2024-05-30

## 2024-05-30 RX ORDER — ONDANSETRON 2 MG/ML
4 INJECTION INTRAMUSCULAR; INTRAVENOUS
Status: DISCONTINUED | OUTPATIENT
Start: 2024-05-30 | End: 2024-05-30 | Stop reason: HOSPADM

## 2024-05-30 RX ORDER — DIPHENHYDRAMINE HYDROCHLORIDE 50 MG/ML
12.5 INJECTION INTRAMUSCULAR; INTRAVENOUS
Status: DISCONTINUED | OUTPATIENT
Start: 2024-05-30 | End: 2024-05-30 | Stop reason: HOSPADM

## 2024-05-30 RX ADMIN — SODIUM CHLORIDE, POTASSIUM CHLORIDE, SODIUM LACTATE AND CALCIUM CHLORIDE: 600; 310; 30; 20 INJECTION, SOLUTION INTRAVENOUS at 10:40

## 2024-05-30 RX ADMIN — PROPOFOL 30 MG: 10 INJECTION, EMULSION INTRAVENOUS at 12:10

## 2024-05-30 RX ADMIN — ACETAMINOPHEN 650 MG: 325 TABLET ORAL at 12:51

## 2024-05-30 RX ADMIN — PROPOFOL 40 MG: 10 INJECTION, EMULSION INTRAVENOUS at 12:08

## 2024-05-30 RX ADMIN — MIDAZOLAM 2 MG: 1 INJECTION INTRAMUSCULAR; INTRAVENOUS at 11:58

## 2024-05-30 RX ADMIN — PROPOFOL 40 MG: 10 INJECTION, EMULSION INTRAVENOUS at 12:07

## 2024-05-30 RX ADMIN — LIDOCAINE HYDROCHLORIDE 100 MG: 20 INJECTION, SOLUTION EPIDURAL; INFILTRATION; INTRACAUDAL; PERINEURAL at 12:04

## 2024-05-30 RX ADMIN — PROPOFOL 40 MG: 10 INJECTION, EMULSION INTRAVENOUS at 12:04

## 2024-05-30 RX ADMIN — PROPOFOL 40 MG: 10 INJECTION, EMULSION INTRAVENOUS at 12:06

## 2024-05-30 RX ADMIN — PROPOFOL 40 MG: 10 INJECTION, EMULSION INTRAVENOUS at 12:05

## 2024-05-30 RX ADMIN — PROPOFOL 30 MG: 10 INJECTION, EMULSION INTRAVENOUS at 12:13

## 2024-05-30 ASSESSMENT — PAIN DESCRIPTION - PAIN TYPE: TYPE: CHRONIC PAIN

## 2024-05-30 ASSESSMENT — ENCOUNTER SYMPTOMS: SHORTNESS OF BREATH: 0

## 2024-05-30 ASSESSMENT — PAIN DESCRIPTION - ORIENTATION
ORIENTATION: RIGHT;LEFT
ORIENTATION: LEFT;RIGHT

## 2024-05-30 ASSESSMENT — PAIN DESCRIPTION - LOCATION
LOCATION: FOOT
LOCATION: FOOT

## 2024-05-30 ASSESSMENT — PAIN DESCRIPTION - FREQUENCY: FREQUENCY: CONTINUOUS

## 2024-05-30 ASSESSMENT — PAIN DESCRIPTION - DESCRIPTORS
DESCRIPTORS: ACHING
DESCRIPTORS: ACHING;BURNING

## 2024-05-30 ASSESSMENT — PAIN SCALES - GENERAL
PAINLEVEL_OUTOF10: 4
PAINLEVEL_OUTOF10: 5

## 2024-05-30 ASSESSMENT — PAIN - FUNCTIONAL ASSESSMENT: PAIN_FUNCTIONAL_ASSESSMENT: FACE, LEGS, ACTIVITY, CRY, AND CONSOLABILITY (FLACC)

## 2024-05-30 NOTE — DISCHARGE INSTRUCTIONS
Keep bandage on for 24 hours. Weight bearing as tolerated.   Do not soak in tub or pool.    Watch for infection

## 2024-05-30 NOTE — OP NOTE
to first submetatarsal of the left foot. Attention was then drawn to right foot, where 2.5 cc of Leneva adipose allograft was injected to plantar submetatarsal 1. Care was taken to evenly distripute the graft. A bandaid was then applied.     The patient was transported from the operating room to the PACU with vital signs stable and vascularly intact to the left foot.     Electronically signed by Emily Vasquez DPM on 5/30/2024 at 12:19 PM

## 2024-05-30 NOTE — ANESTHESIA POSTPROCEDURE EVALUATION
Department of Anesthesiology  Postprocedure Note    Patient: Charu Magdaleno  MRN: [id number]  YOB: 1975  Date of evaluation: 5/30/2024    Procedure Summary       Date: 05/30/24 Room / Location: [address]    Anesthesia Start: 1159 Anesthesia Stop: 1221    Procedure: LENEVA INJECTION  FOOT INJECTION (Bilateral) Diagnosis:       Bursitis of left foot      Other bursitis, not elsewhere classified, right ankle and foot      (Bursitis of left foot [M77.52])      (Other bursitis, not elsewhere classified, right ankle and foot [M71.571])    Surgeons: Oumou Magdaleno DPM Responsible Provider: Ben Vuong MD    Anesthesia Type: MAC ASA Status: 3            Anesthesia Type: No value filed.    Amina Phase I: Amina Score: 10    Amina Phase II:      Anesthesia Post Evaluation    Cardiovascular status: hemodynamically stable    No notable events documented.

## 2024-05-30 NOTE — H&P
effusion.    Study Details  A limited echo was performed using limited 2D. Definity study was performed.     Stress test 11/01/2022:    IMPRESSION:  EKG portion of pharmacologic stress test is negative for  ischemia.    IMPRESSION:  1. No definitive scintigraphic evidence for reversible ischemia or infarct.  2. Left ventricular ejection fraction of 70%.  3.  Please see report for EKG portion of the examination which will be  performed separately by physician from cardiology.  Risk stratification:  Low risk    Functional Capacity per pt:  1) Pt is able to walk 2 city blocks on level ground without SOB.  2) Pt is able to climb 2 flights of stairs without SOB.  3) Pt is not able to walk up a hill for 1-2 city blocks without SOB.    Past Medical History:     Past Medical History:   Diagnosis Date    Alopecia     Asthma     managed by PCP    Bursitis of both feet     CKD (chronic kidney disease) stage 2, GFR 60-89 ml/min     goes back between CKD stage 2-3. Nephrolgist at [place]    Headache     migraines    Hyperlipidemia     Hypertension     Insomnia     RA (rheumatoid arthritis) ([facility])     Rheumologist- at [place] Dr Hung    Uterine fibroid         Past Surgical History:     Past Surgical History:   Procedure Laterality Date    COLONOSCOPY      normal    ENDOMETRIAL ABLATION      ENDOSCOPY, COLON, DIAGNOSTIC      FOOT SURGERY Left     no hardware    TUBAL LIGATION  1998    WISDOM TOOTH EXTRACTION          Medications Prior to Admission:     Prior to Admission medications    Medication Sig Start Date End Date Taking? Authorizing Provider   Metoprolol Tartrate 75 MG TABS Take 75 mg by mouth daily 3/20/24  Yes ProviderMargoth MD   predniSONE (DELTASONE) 50 MG tablet Take 1 tablet by mouth daily as needed Takes for Rheumatoid arthritis 3/13/24  Yes ProviderMargoth MD   leucovorin calcium (WELLCOVORIN) 5 MG tablet Take 3 tablets by mouth as needed If methotrexate makes pt sick, pt takes day

## 2024-05-30 NOTE — ANESTHESIA PRE PROCEDURE
Department of Anesthesiology  Preprocedure Note       Name:  Charu Magdaleno   Age:  49 y.o.  :  1975                                          MRN:  [id number]         Date:  2024      Surgeon: Surgeon(s):  Oumou Magdaleno DPM    Procedure: Procedure(s):  LENEVA INJECTION  FOOT INJECTION    Medications prior to admission:   Prior to Admission medications    Medication Sig Start Date End Date Taking? Authorizing Provider   rosuvastatin (CRESTOR) 10 MG tablet Take 1 tablet by mouth nightly    Margoth Hernandes MD   methotrexate (RHEUMATREX) 2.5 MG chemo tablet Take 1 tablet by mouth once a week     Margoth Hernandes MD   Metoprolol Tartrate 75 MG TABS Take 75 mg by mouth daily 3/20/24   Margoth Hernandes MD   predniSONE (DELTASONE) 50 MG tablet Take 1 tablet by mouth daily as needed Takes for Rheumatoid arthritis 3/13/24   Margoth Hernandes MD   hydroxychloroquine (PLAQUENIL) 200 MG tablet Take 1 tablet by mouth 2 times daily    Margoth Hernandes MD   leucovorin calcium (WELLCOVORIN) 5 MG tablet Take 3 tablets by mouth as needed If methotrexate makes pt sick, pt takes day after methotrexate    Margoth Hernandes MD   Omega-3 Fatty Acids (FISH OIL) 1000 MG capsule Take 1 capsule by mouth daily    Margoth Hernandes MD   clobetasol (TEMOVATE) 0.05 % external solution Apply topically Uses 2-3 times a week 23   Margoth Hernandes MD   ketoconazole (NIZORAL) 2 % shampoo 2-3 times a week 23   Margoth Hernandes MD   NONFORMULARY Vitamin A 1 tablet PO daily    Margoth Hernandes MD   folic acid (FOLVITE) 1 MG tablet Take 1 tablet by mouth daily 22   Kar Cheung, APRN - NP   albuterol (PROVENTIL) (2.5 MG/3ML) 0.083% nebulizer solution Take 3 mLs by nebulization every 6 hours as needed for Wheezing 20   Amy Stinson, DO   albuterol sulfate HFA (PROAIR HFA) 108 (90 Base) MCG/ACT inhaler Inhale 2 puffs into the lungs every 6 hours as needed

## 2024-10-25 NOTE — PROGRESS NOTES
[facility] NEUROLOGY SPECIALIST  [address]  [place] [address]  Dept: [phone]    PATIENT NAME: Charu Magdaleno  PATIENT MRN: [id number]  PRIMARY CARE PHYSICIAN: Debi Doty MD    HPI:      Charu Magdaleno is a 49 y.o. female with a history of rheumatoid arthritis on hydroxychloroquine when and methotrexate, hyperlipidemia, and hypertension, who presents to clinic today for evaluation of left arm and leg weakness, brain fog, and intermittent sharp headaches.      Ms. Magdaleno states that she initially developed left-sided weakness involving the left foot and ankle, limited range of motion of the left shoulder with associated pain in the neck radiating down into the arm within the past 6 months.  She reports that she has pain in the neck and left hip, as well as low back pain that radiates down the left leg.  She becomes off balance and stumbles at times.  She reports that she has had falls and has been unable to stand up without assistance.  She has noticed numbness in the left foot and ankle.  She denied any clear facial weakness, but states that her face looks asymmetrical to her when she is smiling and pictures.  I reviewed images with her, and did not notice any clear asymmetry.  Charu Magdaleno follows with Suni Eaton CNP at [facility] in pain management and has had some benefit in terms of injections in the back.     Ms. Magdaleno also has a history of brain fog.  She states that this is typically worse for a day or 2 after she takes her methotrexate.  She feels like she is not as sharp as she once was.  She feels that it takes her twice as long and she has to work twice as hard as people doing the same task.  She makes mistakes, such as trying to turn up the volume in her car and turning of the heat instead.  She has not a trouble with ADLs or getting lost in familiar areas.  She follows with Dr. Hung at [facility] in rheumatology.  Adalimumab has been suggested as a DMARD, but

## 2024-10-28 ENCOUNTER — OFFICE VISIT (OUTPATIENT)
Dept: NEUROLOGY | Age: 49
End: 2024-10-28
Payer: COMMERCIAL

## 2024-10-28 VITALS — WEIGHT: 218.5 LBS | HEIGHT: 65 IN | BODY MASS INDEX: 36.4 KG/M2

## 2024-10-28 DIAGNOSIS — R90.89 ABNORMAL FINDING ON MRI OF BRAIN: ICD-10-CM

## 2024-10-28 DIAGNOSIS — R20.0 NUMBNESS AND TINGLING: Primary | ICD-10-CM

## 2024-10-28 DIAGNOSIS — R20.2 NUMBNESS AND TINGLING: Primary | ICD-10-CM

## 2024-10-28 DIAGNOSIS — G44.85 PRIMARY STABBING HEADACHE: ICD-10-CM

## 2024-10-28 DIAGNOSIS — M54.16 LUMBAR BACK PAIN WITH RADICULOPATHY AFFECTING LEFT LOWER EXTREMITY: ICD-10-CM

## 2024-10-28 PROCEDURE — 99204 OFFICE O/P NEW MOD 45 MIN: CPT | Performed by: PSYCHIATRY & NEUROLOGY

## 2024-11-12 ENCOUNTER — HOSPITAL ENCOUNTER (OUTPATIENT)
Age: 49
Setting detail: SPECIMEN
Discharge: HOME | End: 2024-11-12

## 2024-11-12 DIAGNOSIS — M54.16 LUMBAR BACK PAIN WITH RADICULOPATHY AFFECTING LEFT LOWER EXTREMITY: ICD-10-CM

## 2024-11-12 DIAGNOSIS — R20.2 NUMBNESS AND TINGLING: ICD-10-CM

## 2024-11-12 DIAGNOSIS — R20.0 NUMBNESS AND TINGLING: ICD-10-CM

## 2024-11-12 LAB
EST. AVERAGE GLUCOSE BLD GHB EST-MCNC: 117 MG/DL
FOLATE SERPL-MCNC: 12 NG/ML (ref 4.8–24.2)
HBA1C MFR BLD: 5.7 % (ref 4–6)
TSH SERPL DL<=0.05 MIU/L-ACNC: 1.82 UIU/ML (ref 0.27–4.2)
VIT B12 SERPL-MCNC: 825 PG/ML (ref 232–1245)

## 2024-11-13 LAB
ALBUMIN PERCENT: 53 % (ref 45–65)
ALBUMIN SERPL-MCNC: 3.7 G/DL (ref 3.2–5.2)
ALPHA 2 PERCENT: 10 % (ref 6–13)
ALPHA1 GLOB SERPL ELPH-MCNC: 0.4 G/DL (ref 0.1–0.4)
ALPHA1 GLOB SERPL ELPH-MCNC: 6 % (ref 3–6)
ALPHA2 GLOB SERPL ELPH-MCNC: 0.7 G/DL (ref 0.5–0.9)
B-GLOBULIN SERPL ELPH-MCNC: 0.9 G/DL (ref 0.5–1.1)
B-GLOBULIN SERPL ELPH-MCNC: 13 % (ref 11–19)
GAMMA GLOB SERPL ELPH-MCNC: 1.3 G/DL (ref 0.5–1.5)
GAMMA GLOBULIN %: 18 % (ref 9–20)
ITYP INTERPRETATION: NORMAL
PATH REV: NORMAL
PATHOLOGIST: NORMAL
PROT PATTERN SERPL ELPH-IMP: NORMAL
PROT SERPL-MCNC: 6.9 G/DL (ref 6.6–8.7)
TOTAL PROT. SUM,%: 100 % (ref 98–102)
TOTAL PROT. SUM: 7 G/DL (ref 6.3–8.2)

## 2024-11-25 NOTE — PROGRESS NOTES
except LLE 4/5 hip flexion and ankle dorsiflexion.    Sensory function Intact to light touch, pinprick, vibration, proprioception on all 4 extremities      Cerebellar Intact fine motor movement. No involuntary movements or tremors. No ataxia or dysmetria on finger to nose or heel to shin testing      Reflex function DTR 2+ on bilateral UE and LE, symmetric.       Gait                   normal base and arm swing      DATA:     Component      Latest Ref Rng 11/12/2024   Total Protein      6.6 - 8.7 g/dL 6.9    Albumin (calculated)      3.2 - 5.2 g/dL 3.7    Albumin %      45 - 65 % 53    Alpha 1 Globulin      0.1 - 0.4 g/dL 0.4    Alpha 1 %      3 - 6 % 6    Alpha 2 Globulin      0.5 - 0.9 g/dL 0.7    Alpha 2 %      6 - 13 % 10    Beta-Globulin      0.5 - 1.1 g/dL 0.9    Beta Percent      11 - 19 % 13    Gamma-Globulin      0.5 - 1.5 g/dL 1.3    Gamma Globulin %      9 - 20 % 18    Total Prot. Sum      6.3 - 8.2 g/dL 7.0    Total Prot. Sum,%      98 - 102 % 100    Protein Electrophoresis, Serum Normal electrophoretic pattern. Immunotyping is negative for monoclonal immunoglobulin.    Pathologist ELECTRONICALLY SIGNED. RODOLFO CANALES M.D.    Hemoglobin A1C      4.0 - 6.0 % 5.7    eAG (mg/dL)      mg/dL 117    Vitamin B-12      232 - 1245 pg/mL 825    Folate      4.8 - 24.2 ng/mL 12.0    ITYP Interpretation Immunotyping is negative for monoclonal  immunoglobulin.    Pathologist Review ELECTRONICALLY SIGNED. RODOLFO CANALES M.D.    TSH, 3rd Generation      0.27 - 4.20 uIU/mL 1.82      MRI brain 6/5/2024 ([facility]; read only):  partially empty sella.  Otherwise unremarkable.     MRI lumbar spine without gadolinium 5/15/2024 (read only; [facility]): Low-grade chronic degenerative disc disease and facet osteoarthrosis of the spine with low-grade canal and foraminal narrowing.    MRI cervical spine without gadolinium 5/15/2024 (read only; [facility]): Minimal cervical degenerative disc findings in the lower levels

## 2024-11-26 ENCOUNTER — OFFICE VISIT (OUTPATIENT)
Dept: NEUROLOGY | Age: 49
End: 2024-11-26
Payer: COMMERCIAL

## 2024-11-26 VITALS — HEIGHT: 65 IN | BODY MASS INDEX: 35.99 KG/M2 | WEIGHT: 216 LBS

## 2024-11-26 DIAGNOSIS — R20.0 NUMBNESS AND TINGLING: ICD-10-CM

## 2024-11-26 DIAGNOSIS — M54.16 LUMBAR BACK PAIN WITH RADICULOPATHY AFFECTING LEFT LOWER EXTREMITY: ICD-10-CM

## 2024-11-26 DIAGNOSIS — M62.838 MUSCLE SPASM: Primary | ICD-10-CM

## 2024-11-26 DIAGNOSIS — G44.85 PRIMARY STABBING HEADACHE: ICD-10-CM

## 2024-11-26 DIAGNOSIS — R20.2 NUMBNESS AND TINGLING: ICD-10-CM

## 2024-11-26 PROCEDURE — 99214 OFFICE O/P EST MOD 30 MIN: CPT | Performed by: PSYCHIATRY & NEUROLOGY

## 2024-11-26 RX ORDER — CYCLOBENZAPRINE HCL 5 MG
5 TABLET ORAL NIGHTLY PRN
Qty: 30 TABLET | Refills: 11 | Status: SHIPPED | OUTPATIENT
Start: 2024-11-26 | End: 2025-11-26

## 2024-12-04 ENCOUNTER — TELEPHONE (OUTPATIENT)
Dept: NEUROLOGY | Age: 49
End: 2024-12-04

## 2025-01-28 ENCOUNTER — PROCEDURE VISIT (OUTPATIENT)
Dept: PHYSICAL MEDICINE AND REHAB | Age: 50
End: 2025-01-28
Payer: COMMERCIAL

## 2025-01-28 DIAGNOSIS — R53.1 LEFT-SIDED WEAKNESS: Primary | ICD-10-CM

## 2025-01-28 PROCEDURE — 95910 NRV CNDJ TEST 7-8 STUDIES: CPT | Performed by: GENERAL PRACTICE

## 2025-01-28 PROCEDURE — 95885 MUSC TST DONE W/NERV TST LIM: CPT | Performed by: GENERAL PRACTICE

## 2025-01-28 PROCEDURE — 95886 MUSC TEST DONE W/N TEST COMP: CPT | Performed by: GENERAL PRACTICE

## 2025-01-28 NOTE — PROGRESS NOTES
[facility], [facility] PHYSICAL MEDICINE AND REHABILITATION  [address] [place] [address]  [place] [address]  Dept: [phone]  Dept Fax: [phone]    EMG/NCS Left Upper Limb, Left Lower Limb    Subjective:     Charu Magdaleno is a 49 y.o.-year-old female presenting with complaints of left sided (upper and lower extremity)      49 year old female with a history of rheumatoid arthritis presenting for evaluation of left sided upper and lower extremity weakness and numbness. Patient reports these symptoms began about 6 month to a year ago. Endorses associated neck pain as well as pain to her low back radiating into leg. Also has instances of imbalance, which has resulted in falls in the past. Reportedly had MRI brain, C spine, and L spine at [facility], though imaging or reports not available for review. Old MRI lumbar from 2021 showed mild L4-5 central stenosis, otherwise unremarkable. Followed with interventional spine (Dr. Bear) for periodic back injections for pain. Evaluated by neurology.     PMH:  denies diabetes, denies thyroid disease    PSH:  denies neck surgery, denies hand surgery; does endorse history of left foot surgery in 2018, reports postoperative complications with delayed wound healing.      Objective:     Physical Exam    Constitutional: She appears well-developed and well-nourished. In no distress.  Pulmonary/Chest: Respirations WNL and unlabored.  MSK:  Normal cervical spine ROM.  No atrophy of the intrinsic hand musculature.  Neurological: She is alert.  Sensation to light touch intact.  Strength 5/5 grossly.  DTRs 2+ and symmetric.  Negative Rachel's reflex bilaterally.  Negative Tinel sign at the left wrists and left medial elbows. Spurling negative bilaterally.   Skin: Skin is warm and dry with good turgor.       Imaging  MR LUMBAR SPINE WO CONT     FINDINGS:   Unremarkable bone marrow signal.  No congenital canal stenosis.     Normal 
132

## 2025-02-16 NOTE — PROGRESS NOTES
negative.          NEUROLOGICAL EXAMINATION:   VITALS  BP (!) 174/98 (Site: Left Upper Arm, Position: Sitting, Cuff Size: Large Adult)   Pulse 83   Ht 1.651 m (5' 5\")   Wt 98 kg (216 lb)   BMI 35.94 kg/m²       GENERAL PHYSICAL EXAM  General Appearance: No acute distress. Conversant.  Well-groomed.    Eyes: Anicteric sclerae. Moist conjunctivae. No lid-lag  HENT: Atraumatic. Oropharynx clear. Moist mucous membranes.  Neck: Trachea midline. Neck supple.   Lungs: Normal respiratory effort. No intercostal retractions. No dyspnea noted  Abdomen: Soft, non-tender, no masses noted.   Extremities: No clubbing. No cyanosis.No peripheral edema.  Skin: Normal temperature and texture, no rash, no ulcers noted  Pysch: No pseudobulbar affect noted. Euthymic      NEUROLOGICAL EXAM:  Mental status    Alert and oriented x 3; intact memory with no confusion, speech or language problems; no hallucinations or delusions     Cranial nerves    II - visual fields intact to confrontation  III, IV, VI - extra-ocular muscles full: no pupillary defect; no LOWELL, no nystagmus, no ptosis   V - normal facial sensation                                                               VII - normal facial symmetry                                                             VIII - intact hearing                                                                             IX, X - symmetrical palate                                                                  XI - symmetrical shoulder shrug                                                       XII - tongue midline without atrophy or fasciculation      Motor function  Normal muscle bulk and tone; strength 5/5 on all 4 extremities, no pronator drift      Sensory function Intact to light touch, pinprick, vibration, proprioception on all 4 extremities      Cerebellar Intact fine motor movement. No involuntary movements or tremors. No ataxia or dysmetria on finger to nose or heel to shin testing

## 2025-02-18 ENCOUNTER — OFFICE VISIT (OUTPATIENT)
Dept: NEUROLOGY | Age: 50
End: 2025-02-18
Payer: COMMERCIAL

## 2025-02-18 VITALS
WEIGHT: 216 LBS | BODY MASS INDEX: 35.99 KG/M2 | SYSTOLIC BLOOD PRESSURE: 174 MMHG | DIASTOLIC BLOOD PRESSURE: 98 MMHG | HEART RATE: 83 BPM | HEIGHT: 65 IN

## 2025-02-18 DIAGNOSIS — R20.2 NUMBNESS AND TINGLING: ICD-10-CM

## 2025-02-18 DIAGNOSIS — R20.0 NUMBNESS AND TINGLING: ICD-10-CM

## 2025-02-18 DIAGNOSIS — M54.12 CERVICAL RADICULOPATHY: Primary | ICD-10-CM

## 2025-02-18 DIAGNOSIS — G50.1 ATYPICAL FACIAL PAIN: ICD-10-CM

## 2025-02-18 DIAGNOSIS — G44.85 STABBING HEADACHE: ICD-10-CM

## 2025-02-18 PROCEDURE — 99214 OFFICE O/P EST MOD 30 MIN: CPT | Performed by: PSYCHIATRY & NEUROLOGY

## 2025-02-18 PROCEDURE — 3077F SYST BP >= 140 MM HG: CPT | Performed by: PSYCHIATRY & NEUROLOGY

## 2025-02-18 PROCEDURE — 3080F DIAST BP >= 90 MM HG: CPT | Performed by: PSYCHIATRY & NEUROLOGY

## 2025-02-18 RX ORDER — AMITRIPTYLINE HYDROCHLORIDE 10 MG/1
10 TABLET ORAL NIGHTLY
Qty: 30 TABLET | Refills: 11 | Status: SHIPPED | OUTPATIENT
Start: 2025-02-18

## 2025-04-25 ENCOUNTER — HOSPITAL ENCOUNTER (OUTPATIENT)
Dept: PREADMISSION TESTING | Age: 50
Discharge: HOME | End: 2025-04-29

## 2025-04-29 ENCOUNTER — ANESTHESIA EVENT (OUTPATIENT)
Dept: OPERATING ROOM | Age: 50
End: 2025-04-29
Payer: COMMERCIAL

## 2025-04-29 RX ORDER — CALCITRIOL 0.25 UG/1
0.25 CAPSULE, LIQUID FILLED ORAL DAILY
COMMUNITY
Start: 2025-01-22

## 2025-04-29 NOTE — PERIOP NOTE
Patient aware that she needs to call the surgeons office regarding take methotrexate and plaquenil. Patient verbalized understanding

## 2025-04-29 NOTE — PRE-PROCEDURE INSTRUCTIONS
ARRIVE AT THE HOSPITAL ON Thursday 5/15/2025 arrive at 1:00pm at [facility]    Once you enter the hospital lobby, take the elevators to the second floor.  Check-In is at the surgery registration desk.      Continue to take your home medications as you normally do up to and including the night before surgery with the exception of any blood thinning medications.      Blood Thinning Medications:  Please stop prescription blood thinning medications such as Apixaban (Eliquis); Clopidogrel (Plavix); Dabigatran (Pradaxa); Prasugrel (Effient); Rivaroxaban (Xarelto); Ticagrelor (Brilinta); Warfarin (Coumadin) only as directed by your surgeon and/or the prescribing physician    Some common examples of other medications that can thin your blood are: Aspirin, Ibuprofen (Advil, Motrin), Naproxen (Aleve), Meloxicam (Mobic), Celecoxib (Celebrex), Fish Oil, many Herbal Supplements.  These medications should usually be stopped at least 7 days prior to surgery.       Stop using OTC pain relievers containing Aspirin, Ibuprofen (Advil, Motrin) or Naproxen (Aleve) seven days prior to surgery.  It is OK to continue using Tylenol for pain the week prior to surgery.    Failure to stop certain medications may interfere with your scheduled surgery.    If you receive instructions from your surgeon regarding what medications to stop prior to surgery, please follow those specific instructions.      If You Have Diabetes:  Do not take any of your diabetic medications, (injectables or by mouth) the morning of surgery unless otherwise instructed by the doctor who manages your diabetes. If you are taking insulin, contact the doctor the manages your diabetes for instructions about any changes to your insulin dosages the day before surgery.        Please take the following medication(s) the day of surgery with a small sip of water: checking with anesthesia and will call with what I'm instructed     Please use your inhaler(s) if needed and

## 2025-05-15 ENCOUNTER — ANESTHESIA (OUTPATIENT)
Dept: OPERATING ROOM | Age: 50
End: 2025-05-15
Payer: COMMERCIAL

## 2025-05-15 ENCOUNTER — HOSPITAL ENCOUNTER (OUTPATIENT)
Age: 50
Setting detail: OUTPATIENT SURGERY
Discharge: HOME | End: 2025-05-15
Attending: PODIATRIST | Admitting: PODIATRIST
Payer: COMMERCIAL

## 2025-05-15 VITALS
HEIGHT: 65 IN | BODY MASS INDEX: 37.49 KG/M2 | RESPIRATION RATE: 16 BRPM | SYSTOLIC BLOOD PRESSURE: 158 MMHG | OXYGEN SATURATION: 99 % | HEART RATE: 73 BPM | DIASTOLIC BLOOD PRESSURE: 89 MMHG | WEIGHT: 225 LBS | TEMPERATURE: 97.7 F

## 2025-05-15 PROCEDURE — 6360000002 HC RX W HCPCS

## 2025-05-15 PROCEDURE — 7100000011 HC PHASE II RECOVERY - ADDTL 15 MIN: Performed by: PODIATRIST

## 2025-05-15 PROCEDURE — 2709999900 HC NON-CHARGEABLE SUPPLY: Performed by: PODIATRIST

## 2025-05-15 PROCEDURE — 2580000003 HC RX 258: Performed by: ANESTHESIOLOGY

## 2025-05-15 PROCEDURE — 6360000002 HC RX W HCPCS: Performed by: NURSE ANESTHETIST, CERTIFIED REGISTERED

## 2025-05-15 PROCEDURE — 6360000002 HC RX W HCPCS: Performed by: PODIATRIST

## 2025-05-15 PROCEDURE — 6370000000 HC RX 637 (ALT 250 FOR IP): Performed by: ANESTHESIOLOGY

## 2025-05-15 PROCEDURE — C1762 CONN TISS, HUMAN(INC FASCIA): HCPCS | Performed by: PODIATRIST

## 2025-05-15 PROCEDURE — 3600000002 HC SURGERY LEVEL 2 BASE: Performed by: PODIATRIST

## 2025-05-15 PROCEDURE — 7100000010 HC PHASE II RECOVERY - FIRST 15 MIN: Performed by: PODIATRIST

## 2025-05-15 PROCEDURE — 6360000002 HC RX W HCPCS: Performed by: ANESTHESIOLOGY

## 2025-05-15 PROCEDURE — 3700000000 HC ANESTHESIA ATTENDED CARE: Performed by: PODIATRIST

## 2025-05-15 DEVICE — ALLOGRAFT ADIPOSE EXTRACELLULAR MTRX 3 CC LENEVA: Type: IMPLANTABLE DEVICE | Site: FOOT | Status: FUNCTIONAL

## 2025-05-15 RX ORDER — METOCLOPRAMIDE HYDROCHLORIDE 5 MG/ML
10 INJECTION INTRAMUSCULAR; INTRAVENOUS
Status: DISCONTINUED | OUTPATIENT
Start: 2025-05-15 | End: 2025-05-15 | Stop reason: HOSPADM

## 2025-05-15 RX ORDER — SODIUM CHLORIDE 9 MG/ML
INJECTION, SOLUTION INTRAVENOUS PRN
Status: DISCONTINUED | OUTPATIENT
Start: 2025-05-15 | End: 2025-05-15 | Stop reason: HOSPADM

## 2025-05-15 RX ORDER — NALOXONE HYDROCHLORIDE 0.4 MG/ML
INJECTION, SOLUTION INTRAMUSCULAR; INTRAVENOUS; SUBCUTANEOUS PRN
Status: DISCONTINUED | OUTPATIENT
Start: 2025-05-15 | End: 2025-05-15 | Stop reason: HOSPADM

## 2025-05-15 RX ORDER — SODIUM CHLORIDE 0.9 % (FLUSH) 0.9 %
5-40 SYRINGE (ML) INJECTION PRN
Status: DISCONTINUED | OUTPATIENT
Start: 2025-05-15 | End: 2025-05-15 | Stop reason: HOSPADM

## 2025-05-15 RX ORDER — SODIUM CHLORIDE 9 MG/ML
INJECTION, SOLUTION INTRAVENOUS CONTINUOUS
Status: DISCONTINUED | OUTPATIENT
Start: 2025-05-15 | End: 2025-05-15 | Stop reason: HOSPADM

## 2025-05-15 RX ORDER — HYDRALAZINE HYDROCHLORIDE 20 MG/ML
INJECTION INTRAMUSCULAR; INTRAVENOUS
Status: COMPLETED
Start: 2025-05-15 | End: 2025-05-15

## 2025-05-15 RX ORDER — OXYCODONE HYDROCHLORIDE 5 MG/1
5 TABLET ORAL
Status: COMPLETED | OUTPATIENT
Start: 2025-05-15 | End: 2025-05-15

## 2025-05-15 RX ORDER — LABETALOL HYDROCHLORIDE 5 MG/ML
INJECTION, SOLUTION INTRAVENOUS
Status: DISCONTINUED | OUTPATIENT
Start: 2025-05-15 | End: 2025-05-15 | Stop reason: SDUPTHER

## 2025-05-15 RX ORDER — HYDRALAZINE HYDROCHLORIDE 20 MG/ML
5 INJECTION INTRAMUSCULAR; INTRAVENOUS ONCE
Status: COMPLETED | OUTPATIENT
Start: 2025-05-15 | End: 2025-05-15

## 2025-05-15 RX ORDER — SODIUM CHLORIDE, SODIUM LACTATE, POTASSIUM CHLORIDE, CALCIUM CHLORIDE 600; 310; 30; 20 MG/100ML; MG/100ML; MG/100ML; MG/100ML
INJECTION, SOLUTION INTRAVENOUS CONTINUOUS
Status: DISCONTINUED | OUTPATIENT
Start: 2025-05-15 | End: 2025-05-15 | Stop reason: HOSPADM

## 2025-05-15 RX ORDER — SODIUM CHLORIDE 0.9 % (FLUSH) 0.9 %
5-40 SYRINGE (ML) INJECTION EVERY 12 HOURS SCHEDULED
Status: DISCONTINUED | OUTPATIENT
Start: 2025-05-15 | End: 2025-05-15 | Stop reason: HOSPADM

## 2025-05-15 RX ORDER — FENTANYL CITRATE 50 UG/ML
25 INJECTION, SOLUTION INTRAMUSCULAR; INTRAVENOUS EVERY 5 MIN PRN
Status: DISCONTINUED | OUTPATIENT
Start: 2025-05-15 | End: 2025-05-15 | Stop reason: HOSPADM

## 2025-05-15 RX ORDER — LIDOCAINE HYDROCHLORIDE 20 MG/ML
INJECTION, SOLUTION EPIDURAL; INFILTRATION; INTRACAUDAL; PERINEURAL
Status: DISCONTINUED | OUTPATIENT
Start: 2025-05-15 | End: 2025-05-15 | Stop reason: SDUPTHER

## 2025-05-15 RX ORDER — ONDANSETRON 2 MG/ML
4 INJECTION INTRAMUSCULAR; INTRAVENOUS
Status: DISCONTINUED | OUTPATIENT
Start: 2025-05-15 | End: 2025-05-15 | Stop reason: HOSPADM

## 2025-05-15 RX ORDER — MIDAZOLAM HYDROCHLORIDE 1 MG/ML
INJECTION, SOLUTION INTRAMUSCULAR; INTRAVENOUS
Status: DISCONTINUED | OUTPATIENT
Start: 2025-05-15 | End: 2025-05-15 | Stop reason: SDUPTHER

## 2025-05-15 RX ORDER — LIDOCAINE HYDROCHLORIDE 10 MG/ML
INJECTION, SOLUTION EPIDURAL; INFILTRATION; INTRACAUDAL; PERINEURAL PRN
Status: DISCONTINUED | OUTPATIENT
Start: 2025-05-15 | End: 2025-05-15 | Stop reason: ALTCHOICE

## 2025-05-15 RX ORDER — LIDOCAINE HYDROCHLORIDE 10 MG/ML
1 INJECTION, SOLUTION EPIDURAL; INFILTRATION; INTRACAUDAL; PERINEURAL
Status: DISCONTINUED | OUTPATIENT
Start: 2025-05-16 | End: 2025-05-15 | Stop reason: HOSPADM

## 2025-05-15 RX ORDER — PROPOFOL 10 MG/ML
INJECTION, EMULSION INTRAVENOUS
Status: DISCONTINUED | OUTPATIENT
Start: 2025-05-15 | End: 2025-05-15 | Stop reason: SDUPTHER

## 2025-05-15 RX ADMIN — HYDRALAZINE HYDROCHLORIDE 5 MG: 20 INJECTION INTRAMUSCULAR; INTRAVENOUS at 18:04

## 2025-05-15 RX ADMIN — PROPOFOL 40 MG: 10 INJECTION, EMULSION INTRAVENOUS at 16:12

## 2025-05-15 RX ADMIN — PROPOFOL 80 MG: 10 INJECTION, EMULSION INTRAVENOUS at 16:10

## 2025-05-15 RX ADMIN — FENTANYL CITRATE 25 MCG: 50 INJECTION INTRAMUSCULAR; INTRAVENOUS at 16:57

## 2025-05-15 RX ADMIN — LABETALOL HYDROCHLORIDE 5 MG: 5 INJECTION, SOLUTION INTRAVENOUS at 16:14

## 2025-05-15 RX ADMIN — FENTANYL CITRATE 25 MCG: 50 INJECTION INTRAMUSCULAR; INTRAVENOUS at 17:22

## 2025-05-15 RX ADMIN — MIDAZOLAM 2 MG: 1 INJECTION INTRAMUSCULAR; INTRAVENOUS at 16:07

## 2025-05-15 RX ADMIN — OXYCODONE HYDROCHLORIDE 5 MG: 5 TABLET ORAL at 17:27

## 2025-05-15 RX ADMIN — PROPOFOL 20 MG: 10 INJECTION, EMULSION INTRAVENOUS at 16:19

## 2025-05-15 RX ADMIN — LIDOCAINE HYDROCHLORIDE 100 MG: 20 INJECTION, SOLUTION EPIDURAL; INFILTRATION; INTRACAUDAL; PERINEURAL at 16:10

## 2025-05-15 RX ADMIN — SODIUM CHLORIDE, SODIUM LACTATE, POTASSIUM CHLORIDE, AND CALCIUM CHLORIDE: .6; .31; .03; .02 INJECTION, SOLUTION INTRAVENOUS at 13:53

## 2025-05-15 RX ADMIN — PROPOFOL 30 MG: 10 INJECTION, EMULSION INTRAVENOUS at 16:15

## 2025-05-15 ASSESSMENT — PAIN DESCRIPTION - LOCATION
LOCATION: FOOT

## 2025-05-15 ASSESSMENT — PAIN SCALES - GENERAL
PAINLEVEL_OUTOF10: 7
PAINLEVEL_OUTOF10: 0
PAINLEVEL_OUTOF10: 7

## 2025-05-15 ASSESSMENT — PAIN DESCRIPTION - PAIN TYPE
TYPE: SURGICAL PAIN
TYPE: SURGICAL PAIN

## 2025-05-15 ASSESSMENT — PAIN DESCRIPTION - ORIENTATION
ORIENTATION: LEFT

## 2025-05-15 ASSESSMENT — PAIN DESCRIPTION - DESCRIPTORS
DESCRIPTORS: SHARP

## 2025-05-15 ASSESSMENT — ENCOUNTER SYMPTOMS: SHORTNESS OF BREATH: 0

## 2025-05-15 ASSESSMENT — PAIN - FUNCTIONAL ASSESSMENT
PAIN_FUNCTIONAL_ASSESSMENT: PREVENTS OR INTERFERES SOME ACTIVE ACTIVITIES AND ADLS
PAIN_FUNCTIONAL_ASSESSMENT: 0-10
PAIN_FUNCTIONAL_ASSESSMENT: 0-10
PAIN_FUNCTIONAL_ASSESSMENT: PREVENTS OR INTERFERES SOME ACTIVE ACTIVITIES AND ADLS

## 2025-05-15 NOTE — ANESTHESIA POSTPROCEDURE EVALUATION
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    Amina Phase I: Amina Score: 8    Amina Phase II:      Anesthesia Post Evaluation    Patient location during evaluation: PACU  Patient participation: complete - patient participated  Level of consciousness: awake  Airway patency: patent  Nausea & Vomiting: no nausea  Cardiovascular status: blood pressure returned to baseline  Respiratory status: acceptable  Hydration status: euvolemic  Comments: Multimodal analgesia pain management as indicated by procedure  Multimodal analgesia pain management approach  Pain management: adequate    No notable events documented.

## 2025-05-15 NOTE — ANESTHESIA PRE PROCEDURE
Department of Anesthesiology  Preprocedure Note       Name:  Charu Magdaleno   Age:  50 y.o.  :  1975                                          MRN:  [id number]         Date:  5/15/2025      Surgeon: Surgeon(s):  Oumou Magdaleno DPM    Procedure: Procedure(s):  LEFT FOOT LENEVA INJECTION    Medications prior to admission:   Prior to Admission medications    Medication Sig Start Date End Date Taking? Authorizing Provider   calcitRIOL (ROCALTROL) 0.25 MCG capsule Take 1 capsule by mouth daily Takes only Monday thru 25  Yes Margoth Hernandes MD   Methotrexate 25 MG/ML SOSY Inject 25 mg into the skin Once a week at 5 PM Saturday   Yes Margoth Hernandes MD   rosuvastatin (CRESTOR) 10 MG tablet Take 1 tablet by mouth nightly    Margoth Hernandes MD   Metoprolol Tartrate 75 MG TABS Take 75 mg by mouth daily 3/20/24   Margoth Hernandes MD   predniSONE (DELTASONE) 50 MG tablet Take 1 tablet by mouth daily as needed Takes for Rheumatoid arthritis 3/13/24   Margoth Hernandes MD   hydroxychloroquine (PLAQUENIL) 200 MG tablet Take 1 tablet by mouth 2 times daily    Margoth Hernandes MD   leucovorin calcium (WELLCOVORIN) 5 MG tablet Take 3 tablets by mouth as needed If methotrexate makes pt sick, pt takes day after methotrexate    Margoth Hernandes MD   clobetasol (TEMOVATE) 0.05 % external solution Apply topically Uses 2-3 times a week 23   Margoth Hernandes MD   ketoconazole (NIZORAL) 2 % shampoo 2-3 times a week 23   Margoth Hernandes MD   NONFORMULARY Vitamin A 1 tablet PO daily    Margoth Hernandes MD   folic acid (FOLVITE) 1 MG tablet Take 1 tablet by mouth daily 22   Kar Cheung APRN - NP   albuterol (PROVENTIL) (2.5 MG/3ML) 0.083% nebulizer solution Take 3 mLs by nebulization every 6 hours as needed for Wheezing 20   Amy Stinson,    albuterol sulfate HFA (PROAIR HFA) 108 (90 Base) MCG/ACT inhaler Inhale 2 puffs into the lungs every 6

## 2025-05-15 NOTE — OP NOTE
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     Hemostasis: Tamponade    Complications: None    Specimens:   * No specimens in log *    Implants:  * No implants in log *      Drains: * No LDAs found *    Findings:  Infection Present At Time Of Surgery (PATOS) (choose all levels that have infection present):  No infection present  Other Findings: None    Detailed Description of Procedure:   INDICATIONS FOR PROCEDURE:  Patient is a 50 year-old female well known to Dr. Magdaleno with a chief complaint of chronic left foot pain secondary to fat pad atrophy.  Patient has exhausted all conservative options including offloading, inserts, and more supportive shoe gear.  The patient has elected to undergo surgical treatment.  In pre-op all risks and rewards of the aforementioned procedure were discussed at length prior to the patient signing the consent form which was witnessed by a nurse and placed in her chart. The left foot was marked, labs and images reviewed, NPO status confirmed. No guarantees were given or implied.      PROCEDURE IN DETAIL: The patient was brought into the operating room and  left on the cart. A timeout was performed confirming the patient identity, correct site, correct procedure, allergies, and preoperative antibiotics. After adequate sedation by Anesthesia, a local block of one-to-one 1% lidocaine plain and 0.5% bupivacaine plain totaling 10 cc was injected. The surgical site was then scrubbed, prepped,  and draped in the usual aseptic manner.     Attention was directed to the plantar left foot where a foot with significantly atrophied forefoot fat pad was noted.  A 3 mL syringe with an 18-gauge needle was inserted medial to proximal at the glabrous border at the first metatarsal head and the Leneva was injected in multiple planes.  The Leneva did not back out through injection site. Forefoot subcutaneous tissue was palpable and did not migrate.  Dressings were then applied consisting of a Band-Aid.    The patient tolerated the above procedure and anesthesia well without complications. The patient was transported from the operating room to the PACU with vital signs stable and vascular status intact to the left foot.           Electronically signed by Alethea Romeo DPM on 5/15/2025 at 4:26 PM

## 2025-05-15 NOTE — H&P
Interval H&P Note    Pt Name: Charu Magdaleno  MRN: [id number]  YOB: 1975  Date of evaluation: 5/15/2025      [x] I have reviewed the podiatry progress note by Dr. Magdaleno dated 4/18/2025 for an Interval History and Physical note (hardcopy labeled and placed in paper chart).      [x] I have examined  Charu Magdaleno, a 50 y.o. 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 The patient denies new health changes, fever, chills, wheezing, cough, increased SOB, chest pain, open sores or wounds. Denies hx of diabetes. Denies any current blood thinning medications.      Relevant PMH includes asthma (well controlled, rarely needs rescue inhaler), CKD (follows with nephrology), migraines, HLD, HTN, and RA (follows with rheumatology).     Vital signs: BP (!) 172/88   Pulse 71   Temp 97.3 °F (36.3 °C)   Resp 16   Ht 1.651 m (5' 5\")   Wt 102.1 kg (225 lb)   SpO2 98%   BMI 37.44 kg/m²     Allergies:  Tramadol    Social History:     Social History     Socioeconomic History    Marital status: Single     Spouse name: None    Number of children: None    Years of education: None    Highest education level: None   Tobacco Use    Smoking status: Never    Smokeless tobacco: Never   Vaping Use    Vaping status: Never Used   Substance and Sexual Activity    Alcohol use: Never     Alcohol/week: 1.0 standard drink of alcohol     Types: 1 Glasses of wine per week     Comment: nothing in over a year b/c of methotrexate    Drug use: No     Social Drivers of Health     Financial Resource Strain: Low Risk  (2/17/2022)    Overall Financial Resource Strain (CARDIA)     Difficulty of Paying Living Expenses: Not hard at all   Food Insecurity: No Food Insecurity (7/1/2024)    Received from [facility] [facility]    Hunger Screening     Within the past 12 months we worried whether our food would run out before we got money to buy

## 2025-06-09 NOTE — PROGRESS NOTES
[facility] NEUROLOGY SPECIALIST  [address]  [place] [address]  Dept: [phone]    PATIENT NAME: Charu Magdaleno  PATIENT MRN: [id number]  PRIMARY CARE PHYSICIAN: Debi Doty MD    HPI:      Charu Magdaleno is a 49 y.o. female with a history of rheumatoid arthritis on hydroxychloroquine when and methotrexate, hyperlipidemia, and hypertension, who I initially saw in clinic on 10/28/2024 for evaluation  of left arm and leg weakness, brain fog, and intermittent sharp headaches.     Ms. Magdaleno states that she initially developed left-sided weakness involving the left foot and ankle, limited range of motion of the left shoulder with associated pain in the neck radiating down into the arm within the past 6 months.  She reports that she has pain in the neck and left hip, as well as low back pain that radiates down the left leg.  She becomes off balance and stumbles at times.  She reports that she has had falls and has been unable to stand up without assistance.  She has noticed numbness in the left foot and ankle.  She denied any clear facial weakness, but states that her face looks asymmetrical to her when she is smiling and pictures.  I reviewed images with her, and did not notice any clear asymmetry.  Charu Magdaleno follows with Suni Eaton CNP at [facility] in pain management and has had some benefit in terms of injections in the back.      Ms. Magdaleno also has a history of brain fog.  She states that this is typically worse for a day or 2 after she takes her methotrexate.  She feels like she is not as sharp as she once was.  She feels that it takes her twice as long and she has to work twice as hard as people doing the same task.  She makes mistakes, such as trying to turn up the volume in her car and turning of the heat instead.  She has not a trouble with ADLs or getting lost in familiar areas.  She follows with Dr. Hung at [facility] in rheumatology.  Adalimumab has been suggested as

## 2025-06-10 ENCOUNTER — OFFICE VISIT (OUTPATIENT)
Dept: NEUROLOGY | Age: 50
End: 2025-06-10
Payer: COMMERCIAL

## 2025-06-10 VITALS
SYSTOLIC BLOOD PRESSURE: 156 MMHG | HEART RATE: 70 BPM | BODY MASS INDEX: 37.85 KG/M2 | WEIGHT: 227.2 LBS | HEIGHT: 65 IN | DIASTOLIC BLOOD PRESSURE: 100 MMHG

## 2025-06-10 DIAGNOSIS — R20.2 NUMBNESS AND TINGLING OF LEFT SIDE OF FACE: ICD-10-CM

## 2025-06-10 DIAGNOSIS — G43.709 CHRONIC MIGRAINE W/O AURA W/O STATUS MIGRAINOSUS, NOT INTRACTABLE: Primary | ICD-10-CM

## 2025-06-10 DIAGNOSIS — R20.0 NUMBNESS AND TINGLING OF LEFT SIDE OF FACE: ICD-10-CM

## 2025-06-10 PROCEDURE — 3080F DIAST BP >= 90 MM HG: CPT | Performed by: PSYCHIATRY & NEUROLOGY

## 2025-06-10 PROCEDURE — 3077F SYST BP >= 140 MM HG: CPT | Performed by: PSYCHIATRY & NEUROLOGY

## 2025-06-10 PROCEDURE — 99213 OFFICE O/P EST LOW 20 MIN: CPT | Performed by: PSYCHIATRY & NEUROLOGY

## 2025-06-10 RX ORDER — LORATADINE 10 MG/1
TABLET ORAL
COMMUNITY
Start: 2025-06-05

## 2025-06-10 RX ORDER — TOPIRAMATE 50 MG/1
TABLET, FILM COATED ORAL
Qty: 39 TABLET | Refills: 0 | Status: SHIPPED | OUTPATIENT
Start: 2025-06-10 | End: 2025-07-08

## 2025-06-10 NOTE — PATIENT INSTRUCTIONS
Start topiramate 50 mg (Topamax) as a preventative treatment for headache as follows:      We will start this as follows:                                   AM                                    PM  Week 1:           -                                  25 mg (1/2 tab)                                Week 2:           25 (1/2 tab)                 25 mg (1/2 tab)  Week 3:           25 mg (1/2 tab)           50 mg (1 tab)                       Week 4:           50 mg (1 tab)              50 mg (1 tab).

## (undated) DEVICE — GOWN,SIRUS,NONRNF,SETINSLV,XL,20/CS: Brand: MEDLINE

## (undated) DEVICE — 4-PORT MANIFOLD: Brand: NEPTUNE 2

## (undated) DEVICE — SYRINGE MED 10ML LUERLOCK TIP W/O SFTY DISP

## (undated) DEVICE — NEEDLE HYPO 25GA L1.5IN BLU POLYPR HUB S STL REG BVL STR

## (undated) DEVICE — GLOVE SURG SZ 7 CRM LTX FREE POLYISOPRENE POLYMER BEAD ANTI

## (undated) DEVICE — TOWEL,OR,DSP,ST,BLUE,DLX,XR,4/PK,20PK/CS: Brand: MEDLINE

## (undated) DEVICE — GLOVE SURG SZ 65 CRM LTX FREE POLYISOPRENE POLYMER BEAD ANTI

## (undated) DEVICE — SKIN PREP TRAY W/CHG: Brand: MEDLINE INDUSTRIES, INC.

## (undated) DEVICE — HYPODERMIC SAFETY NEEDLE: Brand: MAGELLAN

## (undated) DEVICE — SHOE ORTHOT M SZ M 6 8 FEM 7 9 PD LNR OFF LD 15DEG WDG DSGN

## (undated) DEVICE — GAUZE,SPONGE,4"X4",16PLY,XRAY,STRL,LF: Brand: MEDLINE